# Patient Record
Sex: FEMALE | Race: OTHER | HISPANIC OR LATINO | Employment: FULL TIME | URBAN - METROPOLITAN AREA
[De-identification: names, ages, dates, MRNs, and addresses within clinical notes are randomized per-mention and may not be internally consistent; named-entity substitution may affect disease eponyms.]

---

## 2020-02-28 LAB
EXTERNAL HEMATOCRIT: 29.9 %
EXTERNAL HEMATOCRIT: 29.9 %
EXTERNAL HEMOGLOBIN: 9.9 G/DL
EXTERNAL HEMOGLOBIN: 9.9 G/DL
EXTERNAL SYPHILIS RPR SCREEN: NORMAL
GLUCOSE 1H P 50 G GLC PO SERPL-MCNC: 131 MG/DL (ref 70–183)

## 2020-10-26 LAB
EXTERNAL ABO GROUPING: NORMAL
EXTERNAL ANTIBODY SCREEN: NORMAL
EXTERNAL CHLAMYDIA SCREEN: NORMAL
EXTERNAL GONORRHEA SCREEN: NORMAL
EXTERNAL HEMATOCRIT: 40 %
EXTERNAL HEMOGLOBIN: 13.3 G/DL
EXTERNAL HEPATITIS B SURFACE ANTIGEN: NEGATIVE
EXTERNAL HIV-1/2 AB-AG: NORMAL
EXTERNAL PLATELET COUNT: 353 K/ΜL
EXTERNAL RH FACTOR: POSITIVE
EXTERNAL RUBELLA IGG QUANTITATION: NORMAL
EXTERNAL SYPHILIS RPR SCREEN: NORMAL

## 2021-02-28 LAB
EXTERNAL HEMATOCRIT: 29.9 %
EXTERNAL HEMOGLOBIN: 9.9 G/DL
EXTERNAL PLATELET COUNT: 328 K/ΜL
EXTERNAL SYPHILIS RPR SCREEN: NORMAL
GLUCOSE 1H P 50 G GLC PO SERPL-MCNC: 131 MG/DL (ref 70–183)

## 2021-03-16 ENCOUNTER — INITIAL PRENATAL (OUTPATIENT)
Dept: OBGYN CLINIC | Facility: CLINIC | Age: 26
End: 2021-03-16
Payer: COMMERCIAL

## 2021-03-16 VITALS
WEIGHT: 179 LBS | HEART RATE: 120 BPM | SYSTOLIC BLOOD PRESSURE: 106 MMHG | TEMPERATURE: 98 F | DIASTOLIC BLOOD PRESSURE: 62 MMHG

## 2021-03-16 DIAGNOSIS — O99.280 HYPOTHYROID IN PREGNANCY, ANTEPARTUM: ICD-10-CM

## 2021-03-16 DIAGNOSIS — Z34.80 SUPERVISION OF OTHER NORMAL PREGNANCY, ANTEPARTUM: Primary | ICD-10-CM

## 2021-03-16 DIAGNOSIS — Z3A.28 28 WEEKS GESTATION OF PREGNANCY: ICD-10-CM

## 2021-03-16 DIAGNOSIS — O23.43 URINARY TRACT INFECTION IN MOTHER DURING THIRD TRIMESTER OF PREGNANCY: ICD-10-CM

## 2021-03-16 DIAGNOSIS — E03.9 HYPOTHYROID IN PREGNANCY, ANTEPARTUM: ICD-10-CM

## 2021-03-16 DIAGNOSIS — O09.219 PREVIOUS PRETERM DELIVERY, ANTEPARTUM: ICD-10-CM

## 2021-03-16 DIAGNOSIS — O99.019 MATERNAL ANEMIA IN PREGNANCY, ANTEPARTUM: ICD-10-CM

## 2021-03-16 PROBLEM — O23.40 UTI IN PREGNANCY: Status: ACTIVE | Noted: 2021-03-16

## 2021-03-16 LAB
SL AMB  POCT GLUCOSE, UA: NEGATIVE
SL AMB POCT URINE PROTEIN: 30

## 2021-03-16 PROCEDURE — NOBC: Performed by: CLINICAL NURSE SPECIALIST

## 2021-03-16 PROCEDURE — 76815 OB US LIMITED FETUS(S): CPT | Performed by: CLINICAL NURSE SPECIALIST

## 2021-03-16 PROCEDURE — PNV: Performed by: CLINICAL NURSE SPECIALIST

## 2021-03-16 RX ORDER — LEVOTHYROXINE SODIUM 0.12 MG/1
125 TABLET ORAL DAILY
COMMUNITY

## 2021-03-16 RX ORDER — DOCUSATE SODIUM 100 MG/1
100 CAPSULE, LIQUID FILLED ORAL 2 TIMES DAILY
Qty: 60 CAPSULE | Refills: 3 | Status: SHIPPED | OUTPATIENT
Start: 2021-03-16 | End: 2021-05-04 | Stop reason: HOSPADM

## 2021-03-16 RX ORDER — HYDROXYPROGESTERONE CAPROATE 250 MG/ML
1 INJECTION INTRAMUSCULAR WEEKLY
COMMUNITY
End: 2021-06-03 | Stop reason: ALTCHOICE

## 2021-03-16 RX ORDER — PNV NO.95/FERROUS FUM/FOLIC AC 28MG-0.8MG
1 TABLET ORAL DAILY
COMMUNITY

## 2021-03-16 RX ORDER — PNV NO.95/FERROUS FUM/FOLIC AC 28MG-0.8MG
1 TABLET ORAL DAILY
COMMUNITY
End: 2021-06-03 | Stop reason: SDUPTHER

## 2021-03-16 RX ORDER — FERROUS SULFATE 325(65) MG
325 TABLET ORAL
COMMUNITY
End: 2021-03-16 | Stop reason: SDUPTHER

## 2021-03-16 RX ORDER — LEVOTHYROXINE SODIUM 0.12 MG/1
125 TABLET ORAL DAILY
COMMUNITY
Start: 2020-10-28 | End: 2021-06-03 | Stop reason: SDUPTHER

## 2021-03-16 RX ORDER — HYDROXYPROGESTERONE CAPROATE 250 MG/ML
250 INJECTION INTRAMUSCULAR
COMMUNITY
End: 2021-05-04 | Stop reason: HOSPADM

## 2021-03-16 RX ORDER — FERROUS SULFATE 325(65) MG
325 TABLET ORAL 2 TIMES DAILY WITH MEALS
Qty: 60 TABLET | Refills: 3 | Status: SHIPPED | OUTPATIENT
Start: 2021-03-16 | End: 2021-05-04 | Stop reason: HOSPADM

## 2021-03-16 NOTE — PROGRESS NOTES
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  And will have a lot of pelvic pressure  Sometimes wears a maternity belt- effective at times    The following portions of the patient's history were reviewed and updated as appropriate: allergies, current medications, past family history, past medical history, past social history, past surgical history, and problem list     OB History    Para Term  AB Living   2 1   1   1   SAB TAB Ectopic Multiple Live Births           1      # Outcome Date GA Lbr Ruiz/2nd Weight Sex Delivery Anes PTL Lv   2 Current            1  / 35w6d  2821 g (6 lb 3 5 oz) F Vag-Spont None Y CHANTELLE      Complications:  labor        Aneuploidy screening (low risk/high risk)   OB History        2    Para   1    Term           1    AB        Living   1       SAB        TAB        Ectopic        Multiple        Live Births   1                Pre-gravid BMI: Could not be calculated  Discussed appropriate weight gain in pregnancy based on pre-gravid BMI      Infection Screening   Does the pt have a hx of MRSA? no   Recent travel outside of [place]? no  Zika precautions discussed  COVID symptoms or contact no  Previous VZV vaccine or chicken pox disease:- yes- + hx of vaccine  Vaccinated against Hep B: yes    Immunizations:   influenza vaccine given this flu season: yes   discussed Tdap vaccine administration at 27-28 weeks    Screening  SBIRT Performed yes    Diabetes risk Factors: The following hx was assessed r/t risk for diabetes in pregnancy: Pregestational DM, hx of GDM, BMI >35, 1st degree relative w/ T2 DM, personal hx of PCOS, Metformin use, Prior baby LGA/macrosomia  Pertinent positive: NONE          Hypertension  The following hx was assessed r/t risk for HTN disorder in pregnancy: prior hx of CHTN, gHTN, Pre-eclampsia (or eclampsia or HELLP syndrome), FH  pre-eclampsia, > age 28, Multifetal gestation, Type 1 or Type 2 DM, renal disease, Autoimmune disease, Nulliparity, BMI > 30, > 10 yr pregnancy interval, Previous IUGR/SGA baby  Pertinent positive: none       The following portions of the patient's history were reviewed and updated as appropriate: allergies, current medications, past family history, past medical history, past social history, past surgical history, and problem list                /62 (BP Location: Left arm)   Pulse (!) 120   Temp 98 °F (36 7 °C) (Temporal)   Wt 81 2 kg (179 lb)   LMP 2020         Assessment/Plan:     OB intake completed  She is a  with Patient's last menstrual period was 2020  I reviewed risk factors for pregnancy based on her medical history  Nutrition screening Negative  Referral is not sent  Healthy lifestyles reviewed    Genetic FH reviewed: No risks identified  Risk factors for GDM/HTN: none  Additional risks/problems Identified:   -hx of 35 wk PTD  -hypothyroid  -anemia-recent dx    Discussed genetic screening/testing options- pt interested n/a    Prenatal lab work reviewed  Reviewed routine US to be expected in pregnancy and Sherrill ordered for 34 wks    Pregnancy Education  [facility] Pregnancy Essentials Book reviewed and discussed  Call group and instructions to call the office/answering service in case of an emergency    OB packet/Handouts given addressing   Nutrition, Immunizations, and Medications during pregnancy   Warning Signs During Pregnancy   Baby and Me phone celena guide and support center  [address] and Ultrasounds in Pregnancy    CoronaVirus and Zika precautions discussed and encouraged patient to visit [facility] website for up-to-date information  Warning signs reviewed as well as reasons to call          Problem List Items Addressed This Visit        Pregnancy     Supervision of other normal pregnancy, antepartum - Primary    Relevant Orders    Ambulatory Referral to Maternal Fetal Medicine    Maternal anemia in pregnancy, antepartum    Relevant Medications    ferrous sulfate 325 (65 Fe) mg tablet    docusate sodium (COLACE) 100 mg capsule    Hypothyroid in pregnancy, antepartum    Relevant Medications    levothyroxine 125 mcg tablet    Other Relevant Orders    Ambulatory Referral to Maternal Fetal Medicine    Previous  delivery, antepartum    Relevant Orders    Ambulatory Referral to Maternal Fetal Medicine    UTI in pregnancy

## 2021-03-16 NOTE — ASSESSMENT & PLAN NOTE
Recent dx of anemia of pregnancy - has not started iron  Sent to pharmacy and insctructed to take with OJ or vit c for increased absorption and to take colace daily for constipation prevention, up to BID as needed     Recheck CBC in 6 wks

## 2021-03-16 NOTE — ASSESSMENT & PLAN NOTE
Prior PTD at 35 wks  Currently on Joan weekly  CL done today since not done since level 2 US and is WNL 3 02-3 19cm  S/S PTL reviewed including    Call if having > 4-6 contractions in an hour (average occurring every 10-15 minutes)   Feeling of significant pelvic, lower abdomen, or vaginal pressure   Lower back pain that comes and goes   Any vaginal bleeding or spotting   Watery or bloody vaginal discharge

## 2021-03-16 NOTE — ASSESSMENT & PLAN NOTE
She is a  at 28w5d  Transfer from [address] [place] [address] to date in general  Due for tdap- agreeable but left before given  Exam completed and WNL  Cervix closed/thick  Reassured regaring discomfort- r/t nml pregnancy changes in center of gravity/round ligament   pain  Pap is not indicated and gonorrhea/chlamydia cultures were previously collected and not repeated today  See other A&P for risk factors/identified    I reviewed the expected course of the pregnancy with visits, labs and additional testing  The patient has obtained her prenatal labs and they were reviewed at this visit  She previously declined genetic screening   Had Level 2 US-nml - girl     I have reviewed the maternal as well as infant benefits of breastfeeding with the patient  The patient currently plans to breastfeed  I have reviewed proper nutrition in pregnancy as well as exercise and safe medications that can be used for various common symptoms in pregnancy  Planning TL for contraception  Currently planning [facility] peds- but considering changing to [facility]  All questions were answered to her satisfaction

## 2021-03-16 NOTE — PATIENT INSTRUCTIONS
Contact information for [facility] ([facility]A Maternal Fetal [facility])  Main Number [phone]   [place] Location [phone]    Pregnancy at 32 to 30 Weeks   AMBULATORY CARE:   What changes are happening to your body: You may notice new symptoms such as shortness of breath, heartburn, or swelling of your ankles and feet  You may also have trouble sleeping or contractions  Seek care immediately if:   · You develop a severe headache that does not go away  · You have new or increased vision changes, such as blurred or spotted vision  · You have new or increased swelling in your face or hands  · You have vaginal spotting or bleeding  · Your water broke or you feel warm water gushing or trickling from your vagina  Contact your healthcare provider if:   · You have more than 5 contractions in 1 hour  · You notice any changes in your baby's movements  · You have abdominal cramps, pressure, or tightening  · You have a change in vaginal discharge  · You have chills or a fever  · You have vaginal itching, burning, or pain  · You have yellow, green, white, or foul-smelling vaginal discharge  · You have pain or burning when you urinate, less urine than usual, or pink or bloody urine  · You have questions or concerns about your condition or care  How to care for yourself at this stage of your pregnancy:   · Eat a variety of healthy foods  Healthy foods include fruits, vegetables, whole-grain breads, low-fat dairy foods, beans, lean meats, and fish  Drink liquids as directed  Ask how much liquid to drink each day and which liquids are best for you  Limit caffeine to less than 200 milligrams each day  Limit your intake of fish to 2 servings each week  Choose fish low in mercury such as canned light tuna, shrimp, salmon, cod, or tilapia  Do not  eat fish high in mercury such as swordfish, tilefish, mary mackerel, and shark           · Manage heartburn  by eating 4 or 5 small meals each day instead of large meals  Avoid spicy food  · Manage swelling  by lying down and putting your feet up  · Take prenatal vitamins as directed  Your need for certain vitamins and minerals, such as folic acid, increases during pregnancy  Prenatal vitamins provide some of the extra vitamins and minerals you need  Prenatal vitamins may also help to decrease the risk of certain birth defects  · Talk to your healthcare provider about exercise  Moderate exercise can help you stay fit  Your healthcare provider will help you plan an exercise program that is safe for you during pregnancy  · Do not smoke  Smoking increases your risk of a miscarriage and other health problems during your pregnancy  Smoking can cause your baby to be born too early or weigh less at birth  Ask your healthcare provider for information if you need help quitting  · Do not drink alcohol  Alcohol passes from your body to your baby through the placenta  It can affect your baby's brain development and cause fetal alcohol syndrome (FAS)  FAS is a group of conditions that causes mental, behavior, and growth problems  · Talk to your healthcare provider before you take any medicines  Many medicines may harm your baby if you take them when you are pregnant  Do not take any medicines, vitamins, herbs, or supplements without first talking to your healthcare provider  Never use illegal or street drugs (such as marijuana or cocaine) while you are pregnant  Safety tips during pregnancy:   · Avoid hot tubs and saunas  Do not use a hot tub or sauna while you are pregnant, especially during your first trimester  Hot tubs and saunas may raise your baby's temperature and increase the risk of birth defects  · Avoid toxoplasmosis  This is an infection caused by eating raw meat or being around infected cat feces  It can cause birth defects, miscarriages, and other problems   Wash your hands after you touch raw meat  Make sure any meat is well-cooked before you eat it  Avoid raw eggs and unpasteurized milk  Use gloves or ask someone else to clean your cat's litter box while you are pregnant  Changes that are happening with your baby:  By 30 weeks, your baby may weigh more than 3 pounds  Your baby may be about 11 inches long from the top of the head to the rump (baby's bottom)  Your baby's eyes open and close now  Your baby's kicks and movements are more forceful at this time  What you need to know about prenatal care: Your healthcare provider will check your blood pressure and weight  You may also need the following:  · Blood tests  may be done to check for anemia or blood type  · A urine test  may also be done to check for sugar and protein  These can be signs of gestational diabetes or infection  Protein in your urine may also be a sign of preeclampsia  Preeclampsia is a condition that can develop during week 20 or later of your pregnancy  It causes high blood pressure, and it can cause problems with your kidneys and other organs  · A Tdap vaccine and flu vaccine  may be recommended by your healthcare provider  · A gestational diabetes screen  will be done using an oral glucose tolerance test (OGTT)  An OGTT starts with a blood sugar level check after you have not eaten for 8 hours  You are then given a glucose drink  Your blood sugar level is checked after 1 hour, 2 hours, and sometimes 3 hours  Healthcare providers look at how much your blood sugar level increases from the first check  · Fundal height  is a measurement of your uterus to check your baby's growth  This number is usually the same as the number of weeks that you have been pregnant  Your healthcare provider may also check your baby's position  · Your baby's heart rate  will be checked      © Copyright [address] Information is for End User's use only and may not be sold, redistributed or otherwise used for commercial purposes  All illustrations and images included in CareNotes® are the copyrighted property of A D A M , Inc  or Ramu Ryder  The above information is an  only  It is not intended as medical advice for individual conditions or treatments  Talk to your doctor, nurse or pharmacist before following any medical regimen to see if it is safe and effective for you

## 2021-03-16 NOTE — PROGRESS NOTES
Prenatal Visit  Subjective:   Dickson Bradley is a 31 y o  female  She is a  here for initial PN visit/New OB exam    She is reporting the following pregnancy related complaints:  - pelvic pressure- particularly with standing etc    Denies ctx, LOF or VB  Denies abnormal d/c  Denies decreased FM    OB transfer from [facility] and is up to date  Due for tdap    Just completed OB intake today- see other visit same day  Past history review including family genetic history reveal the following risk factors:  Hypothyroid- on 125 mcg levothyrox, and euthyroid  Hx of PTD at 35 wks- on weekly Joan     Objective:  Patient's last menstrual period was 2020  LMP 2020   Pregravid Weight/BMI: Pregravid weight not on file (BMI Could not be calculated)      OBGyn Exam- see prenatal physical form  2nd/3rd TRIMESTER OBSTETRIC ULTRASOUND  3/16/2021  SHIRA Tran      Alf Perose is a [id number] at 28w5d  INDICATION: hx of PTD/check cervix     TECHNIQUE:   Limited Transvaginal imaging was performed 3/16/2021    The study includes volumetric sweeps and traditional still imaging technique  FINDINGS:    Fetal presentation:  cephalic  Cervical length: 3 08 cm;  Mild Funneling is present     IMPRESSION:     Single intrauterine pregnancy at 28w5d  Normal Cervical length    Assessment & Plan:  1  Supervision of other normal pregnancy, antepartum  Assessment & Plan:  She is a  at 28w5d  Transfer from [facility]  Up to date in general  Due for tdap- agreeable but left before given  Exam completed and WNL  Cervix closed/thick  Reassured regaring discomfort- r/t nml pregnancy changes in center of gravity/round ligament   pain  Pap is not indicated and gonorrhea/chlamydia cultures were previously collected and not repeated today  See other A&P for risk factors/identified    I reviewed the expected course of the pregnancy with visits, labs and additional testing    The patient has obtained her prenatal labs and they were reviewed at this visit  She previously declined genetic screening   Had Level 2 US-nml - girl     I have reviewed the maternal as well as infant benefits of breastfeeding with the patient  The patient currently plans to breastfeed  I have reviewed proper nutrition in pregnancy as well as exercise and safe medications that can be used for various common symptoms in pregnancy  Planning TL for contraception  Currently planning [facility] peds- but considering changing to [facility]  All questions were answered to her satisfaction  2  Maternal anemia in pregnancy, antepartum  Assessment & Plan:  Recent dx of anemia of pregnancy - has not started iron  Sent to pharmacy and insctructed to take with OJ or vit c for increased absorption and to take colace daily for constipation prevention, up to BID as needed  Recheck CBC in 6 wks    Orders:  -     CBC; Future; Expected date: 2021    3  Hypothyroid in pregnancy, antepartum  Assessment & Plan:  Currently on 125 mcg and euthryoid  Last checked end of feb- will be due for recheck end of march-mid April  Followed by Dr Madeline Phipps with [facility] endo-has order at home for recheck          4  Previous  delivery, antepartum  Assessment & Plan:  Prior PTD at 35 wks  Currently on Joan weekly  CL done today since not done since level 2 US and is WNL 3 02-3 19cm  S/S PTL reviewed including    Call if having > 4-6 contractions in an hour (average occurring every 10-15 minutes)   Feeling of significant pelvic, lower abdomen, or vaginal pressure   Lower back pain that comes and goes   Any vaginal bleeding or spotting   Watery or bloody vaginal discharge            5  Urinary tract infection in mother during third trimester of pregnancy  Assessment & Plan:  Hx of UTI in December- recent PETER done and negative     Denies and urinary sxs today      6  28 weeks gestation of pregnancy  -     POCT urine dip      SHIRA Lira  3/16/2021

## 2021-03-16 NOTE — ASSESSMENT & PLAN NOTE
Currently on 125 mcg and euthryoid  Last checked end of feb- will be due for recheck end of march-mid April    Followed by Dr Randa Branham with [facility] endo-has order at home for recheck

## 2021-03-29 ENCOUNTER — TELEPHONE (OUTPATIENT)
Dept: OBGYN CLINIC | Facility: CLINIC | Age: 26
End: 2021-03-29

## 2021-03-29 ENCOUNTER — HOSPITAL ENCOUNTER (OUTPATIENT)
Facility: HOSPITAL | Age: 26
End: 2021-03-29
Attending: OBSTETRICS & GYNECOLOGY | Admitting: OBSTETRICS & GYNECOLOGY
Payer: COMMERCIAL

## 2021-03-29 ENCOUNTER — HOSPITAL ENCOUNTER (OUTPATIENT)
Facility: HOSPITAL | Age: 26
Discharge: HOME | End: 2021-03-29
Attending: OBSTETRICS & GYNECOLOGY | Admitting: OBSTETRICS & GYNECOLOGY
Payer: COMMERCIAL

## 2021-03-29 VITALS
RESPIRATION RATE: 18 BRPM | HEART RATE: 123 BPM | DIASTOLIC BLOOD PRESSURE: 73 MMHG | SYSTOLIC BLOOD PRESSURE: 121 MMHG | TEMPERATURE: 97.9 F

## 2021-03-29 PROCEDURE — 76815 OB US LIMITED FETUS(S): CPT | Performed by: OBSTETRICS & GYNECOLOGY

## 2021-03-29 PROCEDURE — NC001 PR NO CHARGE: Performed by: OBSTETRICS & GYNECOLOGY

## 2021-03-29 PROCEDURE — G0463 HOSPITAL OUTPT CLINIC VISIT: HCPCS

## 2021-03-29 PROCEDURE — 99213 OFFICE O/P EST LOW 20 MIN: CPT

## 2021-03-29 NOTE — PROGRESS NOTES
L&D Triage Note - OB/GYN  Tae Solitario 31 y o  female MRN: [id number]  Unit/Bed#: LD TRIAGE  Encounter: [id number]      Assessment:  31 y o   at 30w4d who presents for evaluation of leakage of fluid  She is not ruptured, with negative pooling, ferning, and nitrazine, and MAX 13 58  She is not in  labor, with closed cervix and no contractions on tocometer  No evidence of infection on wet mount/KOH  Plan:  Stable for discharge home at this time  Reviewed signs and symptoms of  labor  Advised to continue routine prenatal care and follow up with her Ob/Gyn provider      ______________________________________________________________________      Chief Complaint: I had some fluid leak earlier    Subjective:  31 y o  Scooter Venkat at 30w4d who presents for evaluation of leakage of fluid  She reports that earlier today, she had a larger amount of clear/white fluid discharge, did not soak through her pad, and she has not had continued leakage  Given her history of  delivery at 35 weeks and new symptoms, she was advised to present to triage for evaluation  She denies contractions, vaginal bleeding, or decreased fetal movement  She reports that during her last pregnancy she did not feel very uncomfortable when she presented in  labor  Her last intercourse was about 3-4 days ago  She is a patient of Dr Liliya Doe    This pregnancy is complicated by late transfer of care from [facility], history of  delivery at 27 weeks and is on Joan, and hypothyroidism  Objective:  Vitals:    21 1622   BP: 121/73   Pulse: (!) 123   Resp: 18   Temp: 97 9 °F (36 6 °C)       SVE: 0 / 0% / -5  SSE: Physiologic white discharge noted in vaginal vault  No erythema, lesions, lacerations, cervix appears parous    FHT:  150 / Moderate 6 - 25 bpm / 10x10 accelerations present, no decelerations  [place]: none    Wet mount/KOH: No clue cells, no hyphae, no motile organisms  Rupture workup: Nitrazine negative, no Ferning, no Pooling    TAUS  MAX: 13 58  Vertex     Discussed with Dr Jarrett Mcneil MD  3/29/2021 4:57 PM

## 2021-03-29 NOTE — DISCHARGE INSTRUCTIONS
Pregnancy at 31 to 34 [address]:   You may continue to have symptoms such as shortness of breath, heartburn, contractions, or swelling of your ankles and feet  You may be gaining about 1 pound a week now  DISCHARGE INSTRUCTIONS:   Return to the emergency department if:   · You develop a severe headache that does not go away  · You have new or increased vision changes, such as blurred or spotted vision  · You have new or increased swelling in your face or hands  · You have vaginal spotting or bleeding  · Your water broke or you feel warm water gushing or trickling from your vagina  Contact your healthcare provider if:   · You have more than 5 contractions in 1 hour  · You notice any changes in your baby's movements  · You have abdominal cramps, pressure, or tightening  · You have a change in vaginal discharge  · You have chills or a fever  · You have vaginal itching, burning, or pain  · You have yellow, green, white, or foul-smelling vaginal discharge  · You have pain or burning when you urinate, less urine than usual, or pink or bloody urine  · You have questions or concerns about your condition or care  How to care for yourself at this stage of your pregnancy:   · Eat a variety of healthy foods  Healthy foods include fruits, vegetables, whole-grain breads, low-fat dairy foods, beans, lean meats, and fish  Drink liquids as directed  Ask how much liquid to drink each day and which liquids are best for you  Limit caffeine to less than 200 milligrams each day  Limit your intake of fish to 2 servings each week  Choose fish low in mercury such as canned light tuna, shrimp, salmon, cod, or tilapia  Do not  eat fish high in mercury such as swordfish, tilefish, mary mackerel, and shark  · Manage heartburn  by eating 4 or 5 small meals each day instead of large meals  Avoid spicy food  · Manage swelling  by lying down and putting your feet up  · Take prenatal vitamins as directed  Your need for certain vitamins and minerals, such as folic acid, increases during pregnancy  Prenatal vitamins provide some of the extra vitamins and minerals you need  Prenatal vitamins may also help to decrease the risk of certain birth defects  · Talk to your healthcare provider about exercise  Moderate exercise can help you stay fit  Your healthcare provider will help you plan an exercise program that is safe for you during pregnancy  · Do not smoke  Smoking increases your risk of a miscarriage and other health problems during your pregnancy  Smoking can cause your baby to be born too early or weigh less at birth  Ask your healthcare provider for information if you need help quitting  · Do not drink alcohol  Alcohol passes from your body to your baby through the placenta  It can affect your baby's brain development and cause fetal alcohol syndrome (FAS)  FAS is a group of conditions that causes mental, behavior, and growth problems  · Talk to your healthcare provider before you take any medicines  Many medicines may harm your baby if you take them when you are pregnant  Do not take any medicines, vitamins, herbs, or supplements without first talking to your healthcare provider  Never use illegal or street drugs (such as marijuana or cocaine) while you are pregnant  Safety tips during pregnancy:   · Avoid hot tubs and saunas  Do not use a hot tub or sauna while you are pregnant, especially during your first trimester  Hot tubs and saunas may raise your baby's temperature and increase the risk of birth defects  · Avoid toxoplasmosis  This is an infection caused by eating raw meat or being around infected cat feces  It can cause birth defects, miscarriages, and other problems  Wash your hands after you touch raw meat  Make sure any meat is well-cooked before you eat it  Avoid raw eggs and unpasteurized milk   Use gloves or ask someone else to clean your cat's litter box while you are pregnant  Changes that are happening with your baby:  By 34 weeks, your baby may weigh more than 5 pounds  Your baby will be about 12 ½ inches long from the top of the head to the rump (baby's bottom)  Your baby is gaining about ½ pound a week  Your baby's eyes open and close now  Your baby's kicks and movements are more forceful at this time  What you need to know about prenatal care: Your healthcare provider will check your blood pressure and weight  You may also need the following:  · A urine test  may also be done to check for sugar and protein  These can be signs of gestational diabetes or infection  Protein in your urine may also be a sign of preeclampsia  Preeclampsia is a condition that can develop during week 20 or later of your pregnancy  It causes high blood pressure, and it can cause problems with your kidneys and other organs  · A Tdap vaccine  may be recommended by your healthcare provider  · Fundal height  is a measurement of your uterus to check your baby's growth  This number is usually the same as the number of weeks that you have been pregnant  Your healthcare provider may also check your baby's position  · Your baby's heart rate  will be checked  © Copyright [address] Information is for End User's use only and may not be sold, redistributed or otherwise used for commercial purposes  All illustrations and images included in CareNotes® are the copyrighted property of A D A M , Inc  or [facility] Noa Adler   The above information is an  only  It is not intended as medical advice for individual conditions or treatments  Talk to your doctor, nurse or pharmacist before following any medical regimen to see if it is safe and effective for you

## 2021-03-29 NOTE — PROCEDURES
Simin Kwan, a [id number] at 30w4d with an RAS of 6/3/2021, by Last Menstrual Period, was seen at [address] for the following procedure(s): $Procedure Type: MAX]         4 Quadrant MAX  MAX Q1 (cm): 1 5 cm  MAX Q2 (cm): 2 8 cm  MAX Q3 (cm): 4 1 cm  MAX Q4 (cm): 5 2 cm  MAX TOTAL (cm): 13 6 cm  Vertex presentation  Gross fetal movement appreciated

## 2021-03-29 NOTE — TELEPHONE ENCOUNTER
Spoke with patient   Patient reports saturating underwear at 2:15  30 4/7 days  No continuous leakage  H/o PTD On martina  Advised to be seen at THE [facility] due to history and less than 32 weeks  Notified L and D team at THE [facility]

## 2021-03-29 NOTE — TELEPHONE ENCOUNTER
Patient C/O leaking clear fluid starting at 3:10pm today with alta herrera   Spoke to Dr Kierra Wyman and was told to go straight to [address] [place] [address] [place]

## 2021-04-01 ENCOUNTER — ROUTINE PRENATAL (OUTPATIENT)
Dept: OBGYN CLINIC | Facility: CLINIC | Age: 26
End: 2021-04-01
Payer: COMMERCIAL

## 2021-04-01 VITALS
SYSTOLIC BLOOD PRESSURE: 114 MMHG | TEMPERATURE: 98 F | HEART RATE: 111 BPM | DIASTOLIC BLOOD PRESSURE: 70 MMHG | WEIGHT: 181 LBS

## 2021-04-01 DIAGNOSIS — E03.9 HYPOTHYROID IN PREGNANCY, ANTEPARTUM: ICD-10-CM

## 2021-04-01 DIAGNOSIS — Z23 NEED FOR TDAP VACCINATION: ICD-10-CM

## 2021-04-01 DIAGNOSIS — O99.280 HYPOTHYROID IN PREGNANCY, ANTEPARTUM: ICD-10-CM

## 2021-04-01 DIAGNOSIS — Z3A.31 31 WEEKS GESTATION OF PREGNANCY: Primary | ICD-10-CM

## 2021-04-01 DIAGNOSIS — O99.019 MATERNAL ANEMIA IN PREGNANCY, ANTEPARTUM: ICD-10-CM

## 2021-04-01 DIAGNOSIS — O09.219 PREVIOUS PRETERM DELIVERY, ANTEPARTUM: ICD-10-CM

## 2021-04-01 DIAGNOSIS — Z34.80 SUPERVISION OF OTHER NORMAL PREGNANCY, ANTEPARTUM: ICD-10-CM

## 2021-04-01 LAB
ERYTHROCYTE [DISTWIDTH] IN BLOOD BY AUTOMATED COUNT: 15.6 % (ref 11.6–15.1)
FERRITIN SERPL-MCNC: 4 NG/ML (ref 8–388)
HCT VFR BLD AUTO: 31 % (ref 34.8–46.1)
HGB BLD-MCNC: 9 G/DL (ref 11.5–15.4)
MCH RBC QN AUTO: 22.9 PG (ref 26.8–34.3)
MCHC RBC AUTO-ENTMCNC: 29 G/DL (ref 31.4–37.4)
MCV RBC AUTO: 79 FL (ref 82–98)
PLATELET # BLD AUTO: 350 THOUSANDS/UL (ref 149–390)
PMV BLD AUTO: 10.1 FL (ref 8.9–12.7)
RBC # BLD AUTO: 3.93 MILLION/UL (ref 3.81–5.12)
SL AMB  POCT GLUCOSE, UA: NEGATIVE
SL AMB POCT URINE PROTEIN: NEGATIVE
TSH SERPL DL<=0.05 MIU/L-ACNC: 3.18 UIU/ML (ref 0.36–3.74)
WBC # BLD AUTO: 10.85 THOUSAND/UL (ref 4.31–10.16)

## 2021-04-01 PROCEDURE — 36415 COLL VENOUS BLD VENIPUNCTURE: CPT | Performed by: OBSTETRICS & GYNECOLOGY

## 2021-04-01 PROCEDURE — 83550 IRON BINDING TEST: CPT | Performed by: OBSTETRICS & GYNECOLOGY

## 2021-04-01 PROCEDURE — PNV: Performed by: OBSTETRICS & GYNECOLOGY

## 2021-04-01 PROCEDURE — 82728 ASSAY OF FERRITIN: CPT | Performed by: OBSTETRICS & GYNECOLOGY

## 2021-04-01 PROCEDURE — 85027 COMPLETE CBC AUTOMATED: CPT | Performed by: OBSTETRICS & GYNECOLOGY

## 2021-04-01 PROCEDURE — 84443 ASSAY THYROID STIM HORMONE: CPT | Performed by: OBSTETRICS & GYNECOLOGY

## 2021-04-01 NOTE — PATIENT INSTRUCTIONS
Labor   WHAT YOU NEED TO KNOW:    (premature) labor occurs when the uterus contracts and your cervix opens earlier than normal  The cervix is the opening of your uterus   labor happens after the 20th week of pregnancy but before the 37th week  You may have premature rupture of membranes (PROM)  PROM means your water broke before labor began  An early labor could cause you to have your baby before he or she is ready to be born  DISCHARGE INSTRUCTIONS:   Call your local emergency number (911 in the [address]) if:   · You see or feel like there is something in your vagina  Call your doctor if:   · You have bright red, painless vaginal bleeding  · Your symptoms do not get better or they get worse  · Your water broke or you feel warm water gushing or trickling from your vagina  · You have contractions that get stronger and closer together for more than 1 hour  · You notice a decrease in your baby's movement  · You have abdominal cramps, pressure, or tightening  · You have a change in vaginal discharge  · You have a fever  · You have burning when you urinate or you are urinating less than is normal for you  · You have questions or concerns about your condition or care  Medicines:   · Antibiotics  may be given to treat a bacterial infection  · Take your medicine as directed  Contact your healthcare provider if you think your medicine is not helping or if you have side effects  Tell him or her if you are allergic to any medicine  Keep a list of the medicines, vitamins, and herbs you take  Include the amounts, and when and why you take them  Bring the list or the pill bottles to follow-up visits  Carry your medicine list with you in case of an emergency  Self-care:   · Rest  as much as possible  You may need to lie on your left side to improve circulation to the uterus and baby   You may be able to prevent  labor by resting and reducing your physical activity  · Ask your healthcare provider about activities that are safe for you to do  Your healthcare provider or obstetrician may recommend that you avoid sexual intercourse  Ask your healthcare provider if exercise is safe  · Drink liquids as directed  Ask how much liquid to drink each day and which liquids are best for you  · Do not smoke  Your baby may not grow well and he or she may weigh less at birth if you smoke during pregnancy  Smoking also increases the risk that your baby will be born too early  Nicotine and other chemicals in cigarettes and cigars can cause lung damage  Ask your healthcare provider for information if you currently smoke and need help to quit  E-cigarettes or smokeless tobacco still contain nicotine  Talk to your healthcare provider before you use these products  · Do not drink alcohol  Alcohol may harm your unborn baby and cause  labor  · Maintain a healthy weight  A healthy weight may prevent  labor  Ask your healthcare provider how much weight you should gain during your pregnancy  Follow up with your doctor as directed:  Write down your questions so you remember to ask them during your visits  © Copyright [address] Information is for End User's use only and may not be sold, redistributed or otherwise used for commercial purposes  All illustrations and images included in CareNotes® are the copyrighted property of A D A M , Inc  or [employer]  The above information is an  only  It is not intended as medical advice for individual conditions or treatments  Talk to your doctor, nurse or pharmacist before following any medical regimen to see if it is safe and effective for you

## 2021-04-01 NOTE — PROGRESS NOTES
OB/GYN  PN Visit  Apolonio Dubin  [id number]  2021  8:32 AM  Dr Susan Maddox MD    S: 31 y o  [id number] 31w0d here for PN visit  Chief Complaint   Patient presents with    Routine Prenatal Visit     2wk PNV- States she received Tdap 21 @ [facility]  Patient would like to discuss ER instructions         OB complaints:  Contractions: no  Leakage: no  Bleeding: no  Fetal movement: yes       O:  /70 (BP Location: Right arm, Cuff Size: Standard)   Pulse (!) 111   Temp 98 °F (36 7 °C) (Temporal)   Wt 82 1 kg (181 lb)   LMP 2020       Review of Systems   Constitutional: Negative  HENT: Negative  Eyes: Negative  Respiratory: Negative  Cardiovascular: Negative  Gastrointestinal: Negative  Endocrine: Negative  Genitourinary:        As noted in HPI   Musculoskeletal: Negative  Skin: Negative  Allergic/Immunologic: Negative  Neurological: Negative  Hematological: Negative  Psychiatric/Behavioral: Negative  Physical Exam  Constitutional:       General: She is not in acute distress  Appearance: She is well-developed  Abdominal:      Palpations: Abdomen is soft  Tenderness: There is no abdominal tenderness  There is no guarding  Neurological:      Mental Status: She is alert and oriented to person, place, and time  Skin:     General: Skin is warm and dry     Psychiatric:         Behavior: Behavior normal            Fundal Height (cm): 32 cm  Fetal Heart Rate: 156          A/P:    Problem List Items Addressed This Visit        Endocrine    Hypothyroid in pregnancy, antepartum       Other    Supervision of other normal pregnancy, antepartum    Maternal anemia in pregnancy, antepartum    Relevant Orders    CBC    Ferritin    Previous  delivery, antepartum      Other Visit Diagnoses     31 weeks gestation of pregnancy    -  Primary    Relevant Orders    POCT urine dip    Need for Tdap vaccination            TSH was ordered and drawn    On iron twice daily with orange juice, repeat CBC and ferritin drawn in office (Last Hgb 9 9), glucola normal    Continue Joan until 36 weeks - will preauth for 1 more month  PTL precautions    MFM US ordered through [facility]    Follow-up in 2 weeks    Dannie Mays MD  4/1/2021  8:32 AM

## 2021-04-02 DIAGNOSIS — O99.019 MATERNAL ANEMIA IN PREGNANCY, ANTEPARTUM: Primary | ICD-10-CM

## 2021-04-02 LAB — TIBC SERPL-MCNC: 705 UG/DL (ref 250–450)

## 2021-04-02 RX ORDER — SODIUM CHLORIDE 9 MG/ML
20 INJECTION, SOLUTION INTRAVENOUS ONCE
Status: CANCELLED | OUTPATIENT
Start: 2021-04-06

## 2021-04-02 NOTE — RESULT ENCOUNTER NOTE
Spoke with pt and she is taking her Levothyroxine in the morning prior on an empty stomach,  however she has been taking it ith her iron supplements  I reviewed this may be interfering and I will hold off on adjusting her dose and give another 3-4 weeks and  Advised she stop oral iron  Regarding anemia and iron deficiency  I recommended that we start her on iron infusions    See therapy plan

## 2021-04-02 NOTE — RESULT ENCOUNTER NOTE
Abnormal H/H and Ferritin despite oral replacement     Recommend IV iron infusions  Patient will need approximately 5 infusions     Also noted she had TSH done same day and this is mildly elevated for 3rd trimester ideal range  Will recommend increased to 150 mcg after reviewing that she is taking medication properly  Call placed patient in no answer voicemail message left for her to call me back    Also sent message to my chart portal

## 2021-04-06 ENCOUNTER — HOSPITAL ENCOUNTER (OUTPATIENT)
Dept: INFUSION CENTER | Facility: CLINIC | Age: 26
Discharge: HOME | End: 2021-04-06
Payer: COMMERCIAL

## 2021-04-06 VITALS
RESPIRATION RATE: 18 BRPM | TEMPERATURE: 98.8 F | SYSTOLIC BLOOD PRESSURE: 110 MMHG | DIASTOLIC BLOOD PRESSURE: 69 MMHG | HEART RATE: 109 BPM

## 2021-04-06 DIAGNOSIS — O99.019 MATERNAL ANEMIA IN PREGNANCY, ANTEPARTUM: Primary | ICD-10-CM

## 2021-04-06 PROCEDURE — 96365 THER/PROPH/DIAG IV INF INIT: CPT

## 2021-04-06 RX ORDER — SODIUM CHLORIDE 9 MG/ML
20 INJECTION, SOLUTION INTRAVENOUS ONCE
Status: CANCELLED | OUTPATIENT
Start: 2021-04-09

## 2021-04-06 RX ORDER — SODIUM CHLORIDE 9 MG/ML
20 INJECTION, SOLUTION INTRAVENOUS ONCE
Status: COMPLETED | OUTPATIENT
Start: 2021-04-06 | End: 2021-04-06

## 2021-04-06 RX ADMIN — SODIUM CHLORIDE 20 ML/HR: 0.9 INJECTION, SOLUTION INTRAVENOUS at 15:06

## 2021-04-06 RX ADMIN — SODIUM CHLORIDE 200 MG: 9 INJECTION, SOLUTION INTRAVENOUS at 15:06

## 2021-04-06 NOTE — PROGRESS NOTES
Pt arrived to unit without complaint, tolerated Venofer infusion without any adverse reaction  Pt left unit in stable condition without question or concern, AVS declined, pt aware of upcoming schedule

## 2021-04-06 NOTE — PLAN OF CARE
Problem: Potential for Falls  Goal: Patient will remain free of falls  Description: INTERVENTIONS:  - Assess patient frequently for physical needs  -  Identify cognitive and physical deficits and behaviors that affect risk of falls    -  [place] fall precautions as indicated by assessment   - Educate patient/family on patient safety including physical limitations  - Instruct patient to call for assistance with activity based on assessment  - Modify environment to reduce risk of injury  - Consider OT/PT consult to assist with strengthening/mobility  Outcome: Progressing

## 2021-04-07 ENCOUNTER — TELEPHONE (OUTPATIENT)
Dept: OBGYN CLINIC | Facility: CLINIC | Age: 26
End: 2021-04-07

## 2021-04-07 NOTE — TELEPHONE ENCOUNTER
Called patients insurance at [phone] for prior Eliana Desai for [facility]  Spoke to: Unruly Finch, states that patient has met her deductible and medication will be covered at 100%  Auth# [id number] was approved for 4/7-6/6

## 2021-04-09 ENCOUNTER — HOSPITAL ENCOUNTER (OUTPATIENT)
Dept: INFUSION CENTER | Facility: CLINIC | Age: 26
Discharge: HOME | End: 2021-04-09
Payer: COMMERCIAL

## 2021-04-09 VITALS
HEART RATE: 88 BPM | TEMPERATURE: 98.3 F | RESPIRATION RATE: 20 BRPM | DIASTOLIC BLOOD PRESSURE: 72 MMHG | SYSTOLIC BLOOD PRESSURE: 116 MMHG

## 2021-04-09 DIAGNOSIS — O99.019 MATERNAL ANEMIA IN PREGNANCY, ANTEPARTUM: Primary | ICD-10-CM

## 2021-04-09 PROCEDURE — 96365 THER/PROPH/DIAG IV INF INIT: CPT

## 2021-04-09 RX ORDER — SODIUM CHLORIDE 9 MG/ML
20 INJECTION, SOLUTION INTRAVENOUS ONCE
Status: COMPLETED | OUTPATIENT
Start: 2021-04-09 | End: 2021-04-09

## 2021-04-09 RX ORDER — SODIUM CHLORIDE 9 MG/ML
20 INJECTION, SOLUTION INTRAVENOUS ONCE
Status: CANCELLED | OUTPATIENT
Start: 2021-04-13

## 2021-04-09 RX ADMIN — SODIUM CHLORIDE 20 ML/HR: 0.9 INJECTION, SOLUTION INTRAVENOUS at 08:30

## 2021-04-09 RX ADMIN — SODIUM CHLORIDE 200 MG: 9 INJECTION, SOLUTION INTRAVENOUS at 08:33

## 2021-04-09 NOTE — PLAN OF CARE
Problem: Potential for Falls  Goal: Patient will remain free of falls  Description: INTERVENTIONS:  - Assess patient frequently for physical needs  -  Identify cognitive and physical deficits and behaviors that affect risk of falls    -  [place] fall precautions as indicated by assessment   - Educate patient/family on patient safety including physical limitations  - Instruct patient to call for assistance with activity based on assessment  - Modify environment to reduce risk of injury  - Consider OT/PT consult to assist with strengthening/mobility  Outcome: Progressing

## 2021-04-13 ENCOUNTER — HOSPITAL ENCOUNTER (OUTPATIENT)
Dept: INFUSION CENTER | Facility: CLINIC | Age: 26
Discharge: HOME | End: 2021-04-13
Payer: COMMERCIAL

## 2021-04-13 VITALS
TEMPERATURE: 99.2 F | HEART RATE: 108 BPM | RESPIRATION RATE: 16 BRPM | DIASTOLIC BLOOD PRESSURE: 75 MMHG | SYSTOLIC BLOOD PRESSURE: 123 MMHG

## 2021-04-13 DIAGNOSIS — O99.019 MATERNAL ANEMIA IN PREGNANCY, ANTEPARTUM: Primary | ICD-10-CM

## 2021-04-13 PROCEDURE — 96365 THER/PROPH/DIAG IV INF INIT: CPT

## 2021-04-13 RX ORDER — SODIUM CHLORIDE 9 MG/ML
20 INJECTION, SOLUTION INTRAVENOUS ONCE
Status: CANCELLED | OUTPATIENT
Start: 2021-04-16

## 2021-04-13 RX ORDER — SODIUM CHLORIDE 9 MG/ML
20 INJECTION, SOLUTION INTRAVENOUS ONCE
Status: COMPLETED | OUTPATIENT
Start: 2021-04-13 | End: 2021-04-13

## 2021-04-13 RX ADMIN — SODIUM CHLORIDE 20 ML/HR: 0.9 INJECTION, SOLUTION INTRAVENOUS at 15:00

## 2021-04-13 RX ADMIN — SODIUM CHLORIDE 200 MG: 9 INJECTION, SOLUTION INTRAVENOUS at 15:20

## 2021-04-13 NOTE — PLAN OF CARE
Problem: Potential for Falls  Goal: Patient will remain free of falls  Description: INTERVENTIONS:  - Assess patient frequently for physical needs  -  Identify cognitive and physical deficits and behaviors that affect risk of falls    -  [place] fall precautions as indicated by assessment   - Educate patient/family on patient safety including physical limitations  - Instruct patient to call for assistance with activity based on assessment  - Modify environment to reduce risk of injury  - Consider OT/PT consult to assist with strengthening/mobility  Outcome: Progressing

## 2021-04-16 ENCOUNTER — HOSPITAL ENCOUNTER (OUTPATIENT)
Dept: INFUSION CENTER | Facility: CLINIC | Age: 26
Discharge: HOME | End: 2021-04-16
Payer: COMMERCIAL

## 2021-04-16 VITALS — TEMPERATURE: 97.9 F

## 2021-04-16 DIAGNOSIS — O99.019 MATERNAL ANEMIA IN PREGNANCY, ANTEPARTUM: Primary | ICD-10-CM

## 2021-04-16 PROCEDURE — 96365 THER/PROPH/DIAG IV INF INIT: CPT

## 2021-04-16 RX ORDER — SODIUM CHLORIDE 9 MG/ML
20 INJECTION, SOLUTION INTRAVENOUS ONCE
Status: COMPLETED | OUTPATIENT
Start: 2021-04-16 | End: 2021-04-16

## 2021-04-16 RX ORDER — SODIUM CHLORIDE 9 MG/ML
20 INJECTION, SOLUTION INTRAVENOUS ONCE
Status: CANCELLED | OUTPATIENT
Start: 2021-04-20

## 2021-04-16 RX ADMIN — SODIUM CHLORIDE 200 MG: 9 INJECTION, SOLUTION INTRAVENOUS at 14:59

## 2021-04-16 RX ADMIN — SODIUM CHLORIDE 20 ML/HR: 0.9 INJECTION, SOLUTION INTRAVENOUS at 14:59

## 2021-04-16 NOTE — PROGRESS NOTES
Patient tolerated Venofer infusion with no complications  Pt is aware to return on Monday for next infusion   Declined AVS

## 2021-04-17 NOTE — PROGRESS NOTES
OB/GYN  PN Visit  Michelle Coto  [id number]  2021  11:08 AM  Dr Sage Hercules MD    S: 31 y o   33 4  here for PN visit  Chief Complaint   Patient presents with    Routine Prenatal Visit     Labs UTD, already had TDAP, and needs to sign delivery consent  C/O contractions yesterday every 5-10min apart  OB complaints:  Contractions: yesterday 5-10 mins apart last night, resolved  Leakage: no  Bleeding: no  Fetal movement: yes      O:  /60 (BP Location: Left arm, Cuff Size: Large)   Pulse (!) 111   Temp 98 °F (36 7 °C) (Temporal)   Wt 83 2 kg (183 lb 6 4 oz)   LMP 2020       Review of Systems   Constitutional: Negative  HENT: Negative  Eyes: Negative  Respiratory: Negative  Cardiovascular: Negative  Gastrointestinal: Negative  Endocrine: Negative  Genitourinary:        As noted in HPI   Musculoskeletal: Negative  Skin: Negative  Allergic/Immunologic: Negative  Neurological: Negative  Hematological: Negative  Psychiatric/Behavioral: Negative  Physical Exam  Constitutional:       General: She is not in acute distress  Appearance: She is well-developed  Abdominal:      Palpations: Abdomen is soft  Tenderness: There is no abdominal tenderness  There is no guarding  Neurological:      Mental Status: She is alert and oriented to person, place, and time  Skin:     General: Skin is warm and dry     Psychiatric:         Behavior: Behavior normal            Fundal Height (cm): 34 cm  Fetal Heart Rate: 154  Cl 29-34 mm    Cervix: 1/50/-2/soft midline      A/P:    Problem List Items Addressed This Visit        Endocrine    Hypothyroid in pregnancy, antepartum    Relevant Orders    TSH, 3rd generation with Free T4 reflex       Other    Maternal anemia in pregnancy, antepartum    Relevant Orders    CBC    Previous  delivery, antepartum      Other Visit Diagnoses     Third trimester pregnancy    -  Primary    33 weeks gestation of pregnancy        Relevant Orders    POCT urine dip     contractions              Patient with  contractions last night, now resolved  No contractions on NST  Cervix is slightly dilated at 1 centimeter  Patient with a slightly short cervix at 29-33 millimeters  Patient with history of  labor at 35 weeks and is currently on Norborne injection  I advised  Continuation of 17OHP  Until 36 weeks   labor precautions and advised to call the office if with continued symptoms  Advise administration of betamethasone today and tomorrow for fetal lung maturity  I spoke with [facility] (Dr Cynthia Bautista) and he agrees  Patient will receive 1st dose of betamethasone at [facility]  Follow-up in 2 weeks for routine OB visit  I advised a repeat TSH and CBC in 2 weeks  Patient to have growth scan/ level 2 ultrasound at [facility] today  She is a late transfer from [facility]              Jenn Perez MD  2021  11:08 AM

## 2021-04-17 NOTE — PATIENT INSTRUCTIONS
Pregnancy at 31 to 34 [address]:   You may continue to have symptoms such as shortness of breath, heartburn, contractions, or swelling of your ankles and feet  You may be gaining about 1 pound a week now  DISCHARGE INSTRUCTIONS:   Return to the emergency department if:   · You develop a severe headache that does not go away  · You have new or increased vision changes, such as blurred or spotted vision  · You have new or increased swelling in your face or hands  · You have vaginal spotting or bleeding  · Your water broke or you feel warm water gushing or trickling from your vagina  Contact your healthcare provider if:   · You have more than 5 contractions in 1 hour  · You notice any changes in your baby's movements  · You have abdominal cramps, pressure, or tightening  · You have a change in vaginal discharge  · You have chills or a fever  · You have vaginal itching, burning, or pain  · You have yellow, green, white, or foul-smelling vaginal discharge  · You have pain or burning when you urinate, less urine than usual, or pink or bloody urine  · You have questions or concerns about your condition or care  How to care for yourself at this stage of your pregnancy:   · Eat a variety of healthy foods  Healthy foods include fruits, vegetables, whole-grain breads, low-fat dairy foods, beans, lean meats, and fish  Drink liquids as directed  Ask how much liquid to drink each day and which liquids are best for you  Limit caffeine to less than 200 milligrams each day  Limit your intake of fish to 2 servings each week  Choose fish low in mercury such as canned light tuna, shrimp, salmon, cod, or tilapia  Do not  eat fish high in mercury such as swordfish, tilefish, mary mackerel, and shark  · Manage heartburn  by eating 4 or 5 small meals each day instead of large meals  Avoid spicy food  · Manage swelling  by lying down and putting your feet up  · Take prenatal vitamins as directed  Your need for certain vitamins and minerals, such as folic acid, increases during pregnancy  Prenatal vitamins provide some of the extra vitamins and minerals you need  Prenatal vitamins may also help to decrease the risk of certain birth defects  · Talk to your healthcare provider about exercise  Moderate exercise can help you stay fit  Your healthcare provider will help you plan an exercise program that is safe for you during pregnancy  · Do not smoke  Smoking increases your risk of a miscarriage and other health problems during your pregnancy  Smoking can cause your baby to be born too early or weigh less at birth  Ask your healthcare provider for information if you need help quitting  · Do not drink alcohol  Alcohol passes from your body to your baby through the placenta  It can affect your baby's brain development and cause fetal alcohol syndrome (FAS)  FAS is a group of conditions that causes mental, behavior, and growth problems  · Talk to your healthcare provider before you take any medicines  Many medicines may harm your baby if you take them when you are pregnant  Do not take any medicines, vitamins, herbs, or supplements without first talking to your healthcare provider  Never use illegal or street drugs (such as marijuana or cocaine) while you are pregnant  Safety tips during pregnancy:   · Avoid hot tubs and saunas  Do not use a hot tub or sauna while you are pregnant, especially during your first trimester  Hot tubs and saunas may raise your baby's temperature and increase the risk of birth defects  · Avoid toxoplasmosis  This is an infection caused by eating raw meat or being around infected cat feces  It can cause birth defects, miscarriages, and other problems  Wash your hands after you touch raw meat  Make sure any meat is well-cooked before you eat it  Avoid raw eggs and unpasteurized milk   Use gloves or ask someone else to clean your cat's litter box while you are pregnant  Changes that are happening with your baby:  By 34 weeks, your baby may weigh more than 5 pounds  Your baby will be about 12 ½ inches long from the top of the head to the rump (baby's bottom)  Your baby is gaining about ½ pound a week  Your baby's eyes open and close now  Your baby's kicks and movements are more forceful at this time  What you need to know about prenatal care: Your healthcare provider will check your blood pressure and weight  You may also need the following:  · A urine test  may also be done to check for sugar and protein  These can be signs of gestational diabetes or infection  Protein in your urine may also be a sign of preeclampsia  Preeclampsia is a condition that can develop during week 20 or later of your pregnancy  It causes high blood pressure, and it can cause problems with your kidneys and other organs  · A Tdap vaccine  may be recommended by your healthcare provider  · Fundal height  is a measurement of your uterus to check your baby's growth  This number is usually the same as the number of weeks that you have been pregnant  Your healthcare provider may also check your baby's position  · Your baby's heart rate  will be checked  © Copyright [address] Information is for End User's use only and may not be sold, redistributed or otherwise used for commercial purposes  All illustrations and images included in CareNotes® are the copyrighted property of A D A M , Inc  or [facility] Noa Adler   The above information is an  only  It is not intended as medical advice for individual conditions or treatments  Talk to your doctor, nurse or pharmacist before following any medical regimen to see if it is safe and effective for you

## 2021-04-19 ENCOUNTER — ROUTINE PRENATAL (OUTPATIENT)
Dept: OBGYN CLINIC | Facility: CLINIC | Age: 26
End: 2021-04-19
Payer: COMMERCIAL

## 2021-04-19 ENCOUNTER — ROUTINE PRENATAL (OUTPATIENT)
Dept: PERINATAL CARE | Facility: OTHER | Age: 26
End: 2021-04-19
Payer: COMMERCIAL

## 2021-04-19 ENCOUNTER — HOSPITAL ENCOUNTER (OUTPATIENT)
Dept: INFUSION CENTER | Facility: CLINIC | Age: 26
Discharge: HOME | End: 2021-04-19
Payer: COMMERCIAL

## 2021-04-19 VITALS
BODY MASS INDEX: 32.5 KG/M2 | HEIGHT: 63 IN | WEIGHT: 183.4 LBS | HEART RATE: 111 BPM | SYSTOLIC BLOOD PRESSURE: 102 MMHG | DIASTOLIC BLOOD PRESSURE: 60 MMHG | TEMPERATURE: 98 F

## 2021-04-19 VITALS
SYSTOLIC BLOOD PRESSURE: 100 MMHG | TEMPERATURE: 98.7 F | DIASTOLIC BLOOD PRESSURE: 66 MMHG | HEART RATE: 99 BPM | RESPIRATION RATE: 18 BRPM

## 2021-04-19 VITALS
DIASTOLIC BLOOD PRESSURE: 75 MMHG | HEIGHT: 64 IN | SYSTOLIC BLOOD PRESSURE: 113 MMHG | HEART RATE: 106 BPM | WEIGHT: 183.2 LBS | BODY MASS INDEX: 31.28 KG/M2

## 2021-04-19 DIAGNOSIS — O99.019 MATERNAL ANEMIA IN PREGNANCY, ANTEPARTUM: Primary | ICD-10-CM

## 2021-04-19 DIAGNOSIS — Z3A.33 33 WEEKS GESTATION OF PREGNANCY: ICD-10-CM

## 2021-04-19 DIAGNOSIS — O99.280 HYPOTHYROID IN PREGNANCY, ANTEPARTUM: ICD-10-CM

## 2021-04-19 DIAGNOSIS — E03.9 HYPOTHYROID IN PREGNANCY, ANTEPARTUM: ICD-10-CM

## 2021-04-19 DIAGNOSIS — O47.00 PRETERM CONTRACTIONS: Primary | ICD-10-CM

## 2021-04-19 DIAGNOSIS — Z34.93 THIRD TRIMESTER PREGNANCY: Primary | ICD-10-CM

## 2021-04-19 DIAGNOSIS — O09.219 PREVIOUS PRETERM DELIVERY, ANTEPARTUM: ICD-10-CM

## 2021-04-19 DIAGNOSIS — O47.00 PRETERM CONTRACTIONS: ICD-10-CM

## 2021-04-19 DIAGNOSIS — Z34.80 SUPERVISION OF OTHER NORMAL PREGNANCY, ANTEPARTUM: ICD-10-CM

## 2021-04-19 DIAGNOSIS — O99.019 MATERNAL ANEMIA IN PREGNANCY, ANTEPARTUM: ICD-10-CM

## 2021-04-19 LAB
SL AMB  POCT GLUCOSE, UA: NEGATIVE
SL AMB POCT URINE PROTEIN: NEGATIVE

## 2021-04-19 PROCEDURE — 76811 OB US DETAILED SNGL FETUS: CPT | Performed by: OBSTETRICS & GYNECOLOGY

## 2021-04-19 PROCEDURE — PNV: Performed by: OBSTETRICS & GYNECOLOGY

## 2021-04-19 PROCEDURE — 96365 THER/PROPH/DIAG IV INF INIT: CPT

## 2021-04-19 PROCEDURE — 59025 FETAL NON-STRESS TEST: CPT | Performed by: OBSTETRICS & GYNECOLOGY

## 2021-04-19 RX ORDER — SODIUM CHLORIDE 9 MG/ML
20 INJECTION, SOLUTION INTRAVENOUS ONCE
Status: CANCELLED | OUTPATIENT
Start: 2021-04-20

## 2021-04-19 RX ORDER — SODIUM CHLORIDE 9 MG/ML
20 INJECTION, SOLUTION INTRAVENOUS ONCE
Status: COMPLETED | OUTPATIENT
Start: 2021-04-19 | End: 2021-04-19

## 2021-04-19 RX ORDER — BETAMETHASONE SODIUM PHOSPHATE AND BETAMETHASONE ACETATE 3; 3 MG/ML; MG/ML
12 INJECTION, SUSPENSION INTRA-ARTICULAR; INTRALESIONAL; INTRAMUSCULAR; SOFT TISSUE EVERY 24 HOURS
Status: COMPLETED | OUTPATIENT
Start: 2021-04-19 | End: 2021-04-20

## 2021-04-19 RX ADMIN — SODIUM CHLORIDE 200 MG: 9 INJECTION, SOLUTION INTRAVENOUS at 08:29

## 2021-04-19 RX ADMIN — SODIUM CHLORIDE 20 ML/HR: 0.9 INJECTION, SOLUTION INTRAVENOUS at 08:18

## 2021-04-19 RX ADMIN — BETAMETHASONE SODIUM PHOSPHATE AND BETAMETHASONE ACETATE 12 MG: 3; 3 INJECTION, SUSPENSION INTRA-ARTICULAR; INTRALESIONAL; INTRAMUSCULAR at 13:39

## 2021-04-19 NOTE — LETTER
2021     Abdi Quinones  [address] [facility],[address] [facility]  [address], [facility] [id number]    Patient: María Luevano   YOB: 1995   Date of Visit: 2021       Dear Ms Pizarro: Thank you for referring María Luevano to me for evaluation  Below are my notes for this consultation  If you have questions, please do not hesitate to call me  I look forward to following your patient along with you  Sincerely,        Martha Marrero MD        CC: No Recipients  Martha Marrero MD  2021  7:01 PM  Sign when Signing Visit  Ob ultrasound    Ms Megan Brown  is a 32 yo  patient  Hx of  delivery  Obesity  Normal 1 hr GTT  Fetal ultrasound at 33 4/7   weeks gestation  to evaluate growth  See Ob procedures in EPIC  Ultrasound:    1  Fetal size = dates  EFW= 4lb 14 oz = 41%    2  No fetal anomalies observed  3  Normal MAX    Ob Hx:      2018:  vaginal delivery after spontaneous labor, live baby girl 6lb 3 oz      : Recent transfer of care  On IM progesterone injections for reduction of risk of recurrent  birth  Medical Hx: Asthma as a child  Hypothyroidism Dx in 2018  Surgical Hx: Cholecystectomy in 2018  Medications: Joan 250 mg weekly  Levothyroxine 124 mcg; PNV  Albuterol inhaler 0nce in 6 months  No tobacco, alcohol or illict drug use  Family Hx: not contributory  Hypothyroidism is characterized by inadequate thyroid hormone production, and usually requires elevated TSH, and low free T4 (or free T3)  Sub-clinical hypothyroidism requires elevated TSH but normal free T4  In pregnancy it has demonstrated it does not have to be treated Alexander Harris al  [facility], published in the [facility],[place] [address] of [facility] 2017; [id number] )  Hashimoto's thyroiditis is the most common cause of hypothyroidism in pregnancy, and thyroid peroxidase antibodies are seen in >90% of these women  Untreated or partially treated hypothyroidism is associated with increased risk of pre-eclampsia, abruption,  birth, low birth weight, fetal death, and long-term impaired psychomotor function  Women at high risk for hypothyroidism should be screened with TSH and free FT4  Goal of levothyroxine treatment in pregnancy is maternal serum TSH 0 5-2 0mcu/mL, and free T4 in upper third of normal range  Most women need increase in thyroxine replacement dose  TSH and free T4 levels should be checked preconceptionally, at first prenatal visit in first trimester, 4 weeks after altering the doses, (therefore every 4 weeks until TSH is normal, especially in first 20weeks), and at least every trimester in pregnancy  I reviewed the results of this ultrasound and answered  all the questions over the telephone as Ms Maribel Sushma left the office before I could meet with her, as I was seeing other patients at the time  I apologized when I reached her by telephone  Recommendations:    1  Follow-up ultrasound in 4 weeks to monitor fetal growth and development which was scheduled today  2  I did not discuss an increased risk for  birth  or use of  IM progesterone injections, to reduced the recurrence of  birth  I did review symptoms associated with labor  Thank you for referring your patient to our offices  The limitations of ultrasound to detect all anomalies was reviewed and how it is not  a test to rule out aneuploidy  If you have any further questions do not hesitate to contact us as [phone]      Leslie Reyez MD

## 2021-04-19 NOTE — PLAN OF CARE
Problem: Potential for Falls  Goal: Patient will remain free of falls  Description: INTERVENTIONS:  - Assess patient frequently for physical needs  -  Identify cognitive and physical deficits and behaviors that affect risk of falls    -  Jenison fall precautions as indicated by assessment   - Educate patient/family on patient safety including physical limitations  - Instruct patient to call for assistance with activity based on assessment  - Modify environment to reduce risk of injury  - Consider OT/PT consult to assist with strengthening/mobility  Outcome: Progressing

## 2021-04-20 ENCOUNTER — CLINICAL SUPPORT (OUTPATIENT)
Dept: PERINATAL CARE | Facility: CLINIC | Age: 26
End: 2021-04-20
Payer: COMMERCIAL

## 2021-04-20 DIAGNOSIS — O09.219 PREVIOUS PRETERM DELIVERY, ANTEPARTUM: ICD-10-CM

## 2021-04-20 PROCEDURE — 96372 THER/PROPH/DIAG INJ SC/IM: CPT

## 2021-04-20 RX ADMIN — BETAMETHASONE SODIUM PHOSPHATE AND BETAMETHASONE ACETATE 12 MG: 3; 3 INJECTION, SUSPENSION INTRA-ARTICULAR; INTRALESIONAL; INTRAMUSCULAR at 16:05

## 2021-04-22 ENCOUNTER — TELEPHONE (OUTPATIENT)
Dept: OBGYN CLINIC | Facility: CLINIC | Age: 26
End: 2021-04-22

## 2021-04-22 NOTE — TELEPHONE ENCOUNTER
Called and spoke to Dilip CONTRERAS at [facility]  She explained that it needed to be by auto injector  Pharmacy was stating they office needed to verify the prescription  Called provider Dr Riojas and verified that IM was acceptable  Called [facility] [phone] and verified the prescription  Hellen Marquis stated that it was verified and it would be processed

## 2021-04-22 NOTE — TELEPHONE ENCOUNTER
Incoming voicemail left from a Claudette with [facility] requesting to speak with you in regards to possible confusion on patients injection/therapy  States [facility] cancelled injection due to alternate generic therapy but pt was already using generic which they don't assist with   Direct line is [phone] EXT 2006

## 2021-04-24 PROBLEM — Z3A.33 33 WEEKS GESTATION OF PREGNANCY: Status: ACTIVE | Noted: 2021-04-24

## 2021-04-28 ENCOUNTER — TELEPHONE (OUTPATIENT)
Dept: OBGYN CLINIC | Facility: CLINIC | Age: 26
End: 2021-04-28

## 2021-05-03 ENCOUNTER — HOSPITAL ENCOUNTER (OUTPATIENT)
Facility: HOSPITAL | Age: 26
Setting detail: OBSERVATION
Discharge: HOME | End: 2021-05-04
Attending: STUDENT IN AN ORGANIZED HEALTH CARE EDUCATION/TRAINING PROGRAM | Admitting: STUDENT IN AN ORGANIZED HEALTH CARE EDUCATION/TRAINING PROGRAM
Payer: COMMERCIAL

## 2021-05-03 ENCOUNTER — ROUTINE PRENATAL (OUTPATIENT)
Dept: OBGYN CLINIC | Facility: CLINIC | Age: 26
End: 2021-05-03

## 2021-05-03 ENCOUNTER — TELEPHONE (OUTPATIENT)
Dept: LABOR AND DELIVERY | Facility: HOSPITAL | Age: 26
End: 2021-05-03

## 2021-05-03 VITALS
WEIGHT: 182.4 LBS | DIASTOLIC BLOOD PRESSURE: 62 MMHG | HEART RATE: 107 BPM | SYSTOLIC BLOOD PRESSURE: 110 MMHG | BODY MASS INDEX: 31.31 KG/M2 | TEMPERATURE: 98 F

## 2021-05-03 DIAGNOSIS — O09.219 PREVIOUS PRETERM DELIVERY, ANTEPARTUM: ICD-10-CM

## 2021-05-03 DIAGNOSIS — Z3A.35 35 WEEKS GESTATION OF PREGNANCY: Primary | ICD-10-CM

## 2021-05-03 DIAGNOSIS — O99.280 HYPOTHYROID IN PREGNANCY, ANTEPARTUM: ICD-10-CM

## 2021-05-03 DIAGNOSIS — E03.9 HYPOTHYROID IN PREGNANCY, ANTEPARTUM: ICD-10-CM

## 2021-05-03 DIAGNOSIS — Z34.80 SUPERVISION OF OTHER NORMAL PREGNANCY, ANTEPARTUM: ICD-10-CM

## 2021-05-03 DIAGNOSIS — O99.019 MATERNAL ANEMIA IN PREGNANCY, ANTEPARTUM: ICD-10-CM

## 2021-05-03 LAB
ABO GROUP BLD: NORMAL
BASOPHILS # BLD AUTO: 0.03 THOUSANDS/ΜL (ref 0–0.1)
BASOPHILS NFR BLD AUTO: 0 % (ref 0–1)
BLD GP AB SCN SERPL QL: NEGATIVE
EOSINOPHIL # BLD AUTO: 0.07 THOUSAND/ΜL (ref 0–0.61)
EOSINOPHIL NFR BLD AUTO: 1 % (ref 0–6)
ERYTHROCYTE [DISTWIDTH] IN BLOOD BY AUTOMATED COUNT: 24.5 % (ref 11.6–15.1)
EXTERNAL GROUP B STREP ANTIGEN: NORMAL
HCT VFR BLD AUTO: 37.5 % (ref 34.8–46.1)
HGB BLD-MCNC: 11.3 G/DL (ref 11.5–15.4)
IMM GRANULOCYTES # BLD AUTO: 0.17 THOUSAND/UL (ref 0–0.2)
IMM GRANULOCYTES NFR BLD AUTO: 2 % (ref 0–2)
LYMPHOCYTES # BLD AUTO: 1.31 THOUSANDS/ΜL (ref 0.6–4.47)
LYMPHOCYTES NFR BLD AUTO: 18 % (ref 14–44)
MCH RBC QN AUTO: 24.9 PG (ref 26.8–34.3)
MCHC RBC AUTO-ENTMCNC: 30.1 G/DL (ref 31.4–37.4)
MCV RBC AUTO: 83 FL (ref 82–98)
MONOCYTES # BLD AUTO: 0.71 THOUSAND/ΜL (ref 0.17–1.22)
MONOCYTES NFR BLD AUTO: 10 % (ref 4–12)
NEUTROPHILS # BLD AUTO: 4.86 THOUSANDS/ΜL (ref 1.85–7.62)
NEUTS SEG NFR BLD AUTO: 69 % (ref 43–75)
NRBC BLD AUTO-RTO: 0 /100 WBCS
PLATELET # BLD AUTO: 248 THOUSANDS/UL (ref 149–390)
PMV BLD AUTO: 10.2 FL (ref 8.9–12.7)
RBC # BLD AUTO: 4.54 MILLION/UL (ref 3.81–5.12)
RH BLD: POSITIVE
SL AMB  POCT GLUCOSE, UA: NEGATIVE
SL AMB POCT URINE PROTEIN: NEGATIVE
SPECIMEN EXPIRATION DATE: NORMAL
TSH SERPL DL<=0.05 MIU/L-ACNC: 1.96 UIU/ML (ref 0.36–3.74)
WBC # BLD AUTO: 7.15 THOUSAND/UL (ref 4.31–10.16)

## 2021-05-03 PROCEDURE — PNV: Performed by: OBSTETRICS & GYNECOLOGY

## 2021-05-03 PROCEDURE — G0379 DIRECT REFER HOSPITAL OBSERV: HCPCS

## 2021-05-03 PROCEDURE — G0463 HOSPITAL OUTPT CLINIC VISIT: HCPCS

## 2021-05-03 PROCEDURE — 85025 COMPLETE CBC W/AUTO DIFF WBC: CPT | Performed by: OBSTETRICS & GYNECOLOGY

## 2021-05-03 PROCEDURE — 86900 BLOOD TYPING SEROLOGIC ABO: CPT | Performed by: OBSTETRICS & GYNECOLOGY

## 2021-05-03 PROCEDURE — 86592 SYPHILIS TEST NON-TREP QUAL: CPT | Performed by: OBSTETRICS & GYNECOLOGY

## 2021-05-03 PROCEDURE — 86901 BLOOD TYPING SEROLOGIC RH(D): CPT | Performed by: OBSTETRICS & GYNECOLOGY

## 2021-05-03 PROCEDURE — 86850 RBC ANTIBODY SCREEN: CPT | Performed by: OBSTETRICS & GYNECOLOGY

## 2021-05-03 PROCEDURE — 87150 DNA/RNA AMPLIFIED PROBE: CPT | Performed by: OBSTETRICS & GYNECOLOGY

## 2021-05-03 PROCEDURE — 99204 OFFICE O/P NEW MOD 45 MIN: CPT

## 2021-05-03 PROCEDURE — NC001 PR NO CHARGE: Performed by: STUDENT IN AN ORGANIZED HEALTH CARE EDUCATION/TRAINING PROGRAM

## 2021-05-03 PROCEDURE — 84443 ASSAY THYROID STIM HORMONE: CPT | Performed by: OBSTETRICS & GYNECOLOGY

## 2021-05-03 PROCEDURE — 99219 PR INITIAL OBSERVATION CARE/DAY 50 MINUTES: CPT | Performed by: STUDENT IN AN ORGANIZED HEALTH CARE EDUCATION/TRAINING PROGRAM

## 2021-05-03 RX ORDER — SODIUM CHLORIDE, SODIUM LACTATE, POTASSIUM CHLORIDE, CALCIUM CHLORIDE 600; 310; 30; 20 MG/100ML; MG/100ML; MG/100ML; MG/100ML
125 INJECTION, SOLUTION INTRAVENOUS CONTINUOUS
Status: DISCONTINUED | OUTPATIENT
Start: 2021-05-03 | End: 2021-05-04

## 2021-05-03 RX ORDER — ONDANSETRON 2 MG/ML
4 INJECTION INTRAMUSCULAR; INTRAVENOUS EVERY 6 HOURS PRN
Status: DISCONTINUED | OUTPATIENT
Start: 2021-05-03 | End: 2021-05-04 | Stop reason: HOSPADM

## 2021-05-03 RX ORDER — ACETAMINOPHEN 325 MG/1
650 TABLET ORAL EVERY 6 HOURS PRN
Status: DISCONTINUED | OUTPATIENT
Start: 2021-05-03 | End: 2021-05-04 | Stop reason: HOSPADM

## 2021-05-03 RX ADMIN — SODIUM CHLORIDE 2.5 MILLION UNITS: 9 INJECTION, SOLUTION INTRAVENOUS at 15:21

## 2021-05-03 RX ADMIN — SODIUM CHLORIDE 2.5 MILLION UNITS: 9 INJECTION, SOLUTION INTRAVENOUS at 23:52

## 2021-05-03 RX ADMIN — SODIUM CHLORIDE, SODIUM LACTATE, POTASSIUM CHLORIDE, AND CALCIUM CHLORIDE 1000 ML: .6; .31; .03; .02 INJECTION, SOLUTION INTRAVENOUS at 11:09

## 2021-05-03 RX ADMIN — SODIUM CHLORIDE 5 MILLION UNITS: 0.9 INJECTION, SOLUTION INTRAVENOUS at 11:28

## 2021-05-03 RX ADMIN — ACETAMINOPHEN 650 MG: 325 TABLET ORAL at 16:41

## 2021-05-03 RX ADMIN — SODIUM CHLORIDE, SODIUM LACTATE, POTASSIUM CHLORIDE, AND CALCIUM CHLORIDE 125 ML/HR: .6; .31; .03; .02 INJECTION, SOLUTION INTRAVENOUS at 16:38

## 2021-05-03 RX ADMIN — SODIUM CHLORIDE 2.5 MILLION UNITS: 9 INJECTION, SOLUTION INTRAVENOUS at 18:57

## 2021-05-03 NOTE — H&P
H&P Exam - Obstetrics   Yoana Sullivan 31 y o  female MRN: [id number]  Unit/Bed#: L&D 326-01 Encounter: [id number]      History of Present Illness     Chief Complaint: Contractions    HPI:  Yoana Sullivan is a 31 y o   female with an RAS of 6/3/2021, by Last Menstrual Period at 35w4d weeks gestation who is being admitted for contractions and  cervical dilation  Cervical exam now changed to 5cm dilated from 3cm this morning  Contractions: Present, spacing out slightly  Loss of fluid: No  Vaginal bleeding: no  Fetal movement: present    She is a Dr Borrero Ryan patient  PREGNANCY COMPLICATIONS:   1) History of  delivery at 35 weeks, on [place]  2) Anemia in pregnancy  3) Hypothyroidism in pregnancy    OB History    Para Term  AB Living   2 1   1   1   SAB TAB Ectopic Multiple Live Births           1      # Outcome Date GA Lbr Ruiz/2nd Weight Sex Delivery Anes PTL Lv   2 Current            1  18 35w6d  2821 g (6 lb 3 5 oz) F Vag-Spont None Y CHANTELLE      Complications:  labor       Baby complications/comments: None    Review of Systems   Constitutional: Negative for fever  HENT: Negative for rhinorrhea, sinus pressure, sneezing and sore throat  Eyes: Negative for visual disturbance  Respiratory: Negative for cough, shortness of breath and wheezing  Cardiovascular: Negative for chest pain, palpitations and leg swelling  Gastrointestinal: Positive for abdominal pain  Negative for abdominal distention, blood in stool, nausea and vomiting  Genitourinary: Negative for dysuria, flank pain, vaginal bleeding and vaginal discharge  Musculoskeletal: Negative for neck pain and neck stiffness  Skin: Negative for color change, pallor and rash  Neurological: Negative for light-headedness, numbness and headaches           Historical Information   Past Medical History:   Diagnosis Date    Gallstone     Hypothyroid     Kidney stones      Past Surgical History:   Procedure Laterality Date    CHOLECYSTECTOMY LAPAROSCOPIC  2018    During pregnancy     Social History   Social History     Substance and Sexual Activity   Alcohol Use Not Currently    Frequency: Never     Social History     Substance and Sexual Activity   Drug Use Never     Social History     Tobacco Use   Smoking Status Never Smoker   Smokeless Tobacco Never Used     Family History: non-contributory    Meds/Allergies      Medications Prior to Admission   Medication    levothyroxine 125 mcg tablet    Prenatal Vit-Fe Fumarate-FA (Prenatal Vitamin) 27-0 8 MG TABS    docusate sodium (COLACE) 100 mg capsule    ferrous sulfate 325 (65 Fe) mg tablet    hydroxyprogesterone caproate (ANA) 250 MG/ML injection        Allergies   Allergen Reactions    Doxycycline GI Intolerance       OBJECTIVE:    Vitals: Blood pressure 119/74, pulse 93, temperature 98 6 °F (37 °C), temperature source Oral, resp  rate 14, height 5' 4" (1 626 m), weight 82 7 kg (182 lb 6 4 oz), last menstrual period 08/27/2020  Body mass index is 31 31 kg/m²  Physical Exam  Exam conducted with a chaperone present  Constitutional:       General: She is not in acute distress  Appearance: She is well-developed  She is not diaphoretic  HENT:      Head: Normocephalic and atraumatic  Eyes:      Pupils: Pupils are equal, round, and reactive to light  Neck:      Musculoskeletal: Normal range of motion  Cardiovascular:      Rate and Rhythm: Normal rate  Pulses: Normal pulses  Pulmonary:      Effort: Pulmonary effort is normal  No respiratory distress  Abdominal:      General: There is no distension  Palpations: Abdomen is soft  There is no mass  Tenderness: There is no abdominal tenderness  There is no guarding  Comments: gravid   Genitourinary:     General: Normal vulva  Musculoskeletal: Normal range of motion  General: No deformity  Skin:     General: Skin is warm and dry     Neurological: Mental Status: She is alert and oriented to person, place, and time  Psychiatric:         Behavior: Behavior normal              Cervix:    5/70/-2    Fetal heart rate:   Baseline Rate: 150 bpm  Variability: Moderate 6-25 bpm  Accelerations: 15 x 15 or greater  Decelerations: None    [place]:   Contraction Frequency (minutes): irregular  Contraction Duration (seconds): 50-70  Contraction Quality: Mild    EFW: 5 5lbs    GBS: Unknown, pending    Prenatal Labs:   Blood Type:   Lab Results   Component Value Date/Time    ABO Grouping O 2021 10:56 AM     , D (Rh type):   Lab Results   Component Value Date/Time    Rh Factor Positive 2021 10:56 AM      , HCT/HGB:   Lab Results   Component Value Date/Time    Hematocrit 37 5 2021 10:48 AM    Hemoglobin 11 3 (L) 2021 10:48 AM      , MCV:   Lab Results   Component Value Date/Time    MCV 83 2021 10:48 AM      , Platelets:   Lab Results   Component Value Date/Time    Platelets 550  10:48 AM      , 1 hour Glucola:   Lab Results   Component Value Date/Time    Glucose 131 2021   , Rubella: immune 10/26/20   , VDRL/RPR:   Lab Results   Component Value Date/Time    RPR Non-Reactive 2021     , Hep B:   Lab Results   Component Value Date/Time    Hepatitis B Surface Ag Negative 10/26/2020       , HIV:   Lab Results   Component Value Date/Time    HIV-1/HIV-2 AB Non-Reactive 10/26/2020     , Chlamydia:   Lab Results   Component Value Date/Time    External Chlamydia Screen neg 10/26/2020     , Gonorrhea: neg    , Group B Strep: collected 5/3/21    Invasive Devices     Peripheral Intravenous Line            Peripheral IV 21 Left Arm 17 days    Peripheral IV 21 Left Arm less than 1 day                  Assessment/Plan     ASSESSMENT:  26yo  at 35w4d weeks gestation who is being admitted for  cervical dilation      PLAN:   1) Admit   2) CBC, RPR, Blood Type collected earlier today   3) Expectant management, IV hydration   4) Follow up GBS status, continue PCN for GBS prophylaxis at    5) Analgesia and/or epidural at patient request   6) Continue to monitor cervical change   7) Discussed with Dr Akila Pablo    This patient will be an INPATIENT  and I certify the anticipated length of stay is >2 Midnights      Cash Das MD  5/3/2021  4:28 PM

## 2021-05-03 NOTE — PROGRESS NOTES
L&D Triage Note - OB/GYN  Se Causey 31 y o  female MRN: [id number]  Unit/Bed#: L&D 326-01 Encounter: [id number]    Se Causey is a patient of Tamara Cruz    RAS: Estimated Date of Delivery: 6/3/21    HPI:  31 y o   35w4d presents after being sent over from the office for suspected  labor  Patient states she began ethel overnight q4-8 minutes  She was evaluated by Dr Florencio Cote at her office this morning  Patient was 3/70/-2  Patient has a history of  vaginal delivery at 35 weeks in G1  She has been on Joan during this pregnancy  She states how she feels today is very similar to her prior labor  She received BTM - due to  contractions  Contractions: yes  Leakage of fluid: no  Vaginal Bleeding: no  Fetal movement: present    Her current obstetrical history is significant for prior  delivery, anemia, hypothyroidism    Her past obstetrical history is significant for prior  delivery at 35 weeks- on Joan       ROS:  Constitutional: Negative  Respiratory: Negative  Cardiovascular: Negative    Gastrointestinal: Negative    OBJECTIVE:  LMP 2020   There is no height or weight on file to calculate BMI      Physical Exam      SVE:  Method: Manual  OB Examiner: DeFruscio    FHT:  Baseline Rate: 160 bpm  Variability: Moderate 6-25 bpm  Accelerations: 15 x 15 or greater  Decelerations: None    TOCO:   Contraction Frequency (minutes): 2-6  Contraction Duration (seconds): 10-30  Contraction Quality: Mild    Labs:   Recent Results (from the past 24 hour(s))   POCT urine dip    Collection Time: 21  7:32 AM   Result Value Ref Range    POCT URINE PROTEIN negative     GLUCOSE, UA negative        Patient is seen by Dr Gustavo Nichols is a 31 y o  Orvil Keri at 35w4d here for r/o  labor     PLAN  #1  R/o PTL  · Collect GBS   · PCN for GBS ppx   · 1L LR bolus   · CBC  · Type and Screen   · RPR   · SVE   · 4-6h observation     Re-evaluate after 4-6 hours and determine further disposition at that time      D/w Dr Sivakumar Laura MD  OB/GYN PGY-1  5/3/2021  10:01 AM

## 2021-05-03 NOTE — TELEPHONE ENCOUNTER
Pt not in L and D  She was asked where she was, she states she went home to grab her bags and eat breakfast but is now 10 mins away

## 2021-05-03 NOTE — PROGRESS NOTES
OB/GYN  PN Visit  Royden Closs  [id number]  5/3/2021  7:26 AM  Dr Rhonda Martino MD    S: 31 y o  [id number] 35w4d here for PN visit  Chief Complaint   Patient presents with    Routine Prenatal Visit     Labs UTD, Already had TDAP, and Due for GBS  Patient with c/o of contractions last night 2:30 am - 4 am that lasted approx 11 min  OB complaints:  Contractions: yes  Leakage: no  Bleeding: no  Fetal movement: yes      O:  /62 (BP Location: Right arm, Cuff Size: Standard)   Pulse (!) 107   Temp 98 °F (36 7 °C) (Temporal)   Wt 82 7 kg (182 lb 6 4 oz)   LMP 08/27/2020   BMI 31 31 kg/m²       Review of Systems   Constitutional: Negative  HENT: Negative  Eyes: Negative  Respiratory: Negative  Cardiovascular: Negative  Gastrointestinal: Negative  Endocrine: Negative  Genitourinary:        As noted in HPI   Musculoskeletal: Negative  Skin: Negative  Allergic/Immunologic: Negative  Neurological: Negative  Hematological: Negative  Psychiatric/Behavioral: Negative  Physical Exam  Constitutional:       General: She is not in acute distress  Appearance: Normal appearance  She is well-developed  Genitourinary:      Pelvic exam was performed with patient in the lithotomy position  No vulval lesion, tenderness, ulcerations, Bartholin's cyst or rash noted  No signs of labial injury  No labial fusion  No posterior fourchette tenderness, injury, rash or lesion present  No inguinal adenopathy present in the right or left side  HENT:      Head: Normocephalic  Cardiovascular:      Rate and Rhythm: Normal rate  Pulmonary:      Effort: Pulmonary effort is normal    Abdominal:      General: There is no distension  Palpations: Abdomen is soft  Tenderness: There is no abdominal tenderness  There is no guarding  Hernia: There is no hernia in the left inguinal area or right inguinal area     Musculoskeletal:         General: No swelling or deformity  Lymphadenopathy:      Lower Body: No right inguinal adenopathy  No left inguinal adenopathy  Neurological:      General: No focal deficit present  Mental Status: She is alert and oriented to person, place, and time  Skin:     General: Skin is warm and dry  Psychiatric:         Mood and Affect: Mood normal          Behavior: Behavior normal    Vitals signs reviewed  Fundal Height (cm): 35 cm  Fetal Heart Rate: 145          A/P:    Problem List Items Addressed This Visit        Endocrine    Hypothyroid in pregnancy, antepartum       Other    Supervision of other normal pregnancy, antepartum    Maternal anemia in pregnancy, antepartum    Previous  delivery, antepartum    35 weeks gestation of pregnancy - Primary    Relevant Orders    Strep B DNA probe, amplification    POCT urine dip        GBS collected  No penicillin allergy    Patient was on 17 hydroxyprogesterone caproate until last Fri, needs more as she ran out of injections - to continue until 36 6/7 weeks    Patient is 3 cm dilated  Advised  A period of observation  At least 4 hours at the hospital to check if  labor progresses  patient should have GBS prophylaxis  For GBS unknown and GBS recollected at the hospital       Follow-up in 4 days  Advised patient to stop working for now unless she is able to work from home                    Simone Mcbride MD  5/3/2021  7:26 AM

## 2021-05-04 ENCOUNTER — TELEPHONE (OUTPATIENT)
Dept: OBGYN CLINIC | Facility: CLINIC | Age: 26
End: 2021-05-04

## 2021-05-04 ENCOUNTER — TELEPHONE (OUTPATIENT)
Dept: OTHER | Facility: OTHER | Age: 26
End: 2021-05-04

## 2021-05-04 ENCOUNTER — TELEPHONE (OUTPATIENT)
Dept: LABOR AND DELIVERY | Facility: HOSPITAL | Age: 26
End: 2021-05-04

## 2021-05-04 ENCOUNTER — HOSPITAL ENCOUNTER (INPATIENT)
Facility: HOSPITAL | Age: 26
LOS: 2 days | Discharge: HOME | End: 2021-05-07
Attending: OBSTETRICS & GYNECOLOGY | Admitting: OBSTETRICS & GYNECOLOGY
Payer: COMMERCIAL

## 2021-05-04 VITALS
SYSTOLIC BLOOD PRESSURE: 125 MMHG | BODY MASS INDEX: 31.14 KG/M2 | HEIGHT: 64 IN | RESPIRATION RATE: 14 BRPM | WEIGHT: 182.4 LBS | HEART RATE: 102 BPM | DIASTOLIC BLOOD PRESSURE: 65 MMHG | TEMPERATURE: 98.4 F

## 2021-05-04 DIAGNOSIS — Z3A.35 35 WEEKS GESTATION OF PREGNANCY: ICD-10-CM

## 2021-05-04 LAB — RPR SER QL: NORMAL

## 2021-05-04 PROCEDURE — NC001 PR NO CHARGE: Performed by: OBSTETRICS & GYNECOLOGY

## 2021-05-04 PROCEDURE — 99215 OFFICE O/P EST HI 40 MIN: CPT

## 2021-05-04 PROCEDURE — G0463 HOSPITAL OUTPT CLINIC VISIT: HCPCS

## 2021-05-04 PROCEDURE — 99217 PR OBSERVATION CARE DISCHARGE MANAGEMENT: CPT | Performed by: STUDENT IN AN ORGANIZED HEALTH CARE EDUCATION/TRAINING PROGRAM

## 2021-05-04 PROCEDURE — G0379 DIRECT REFER HOSPITAL OBSERV: HCPCS

## 2021-05-04 RX ORDER — ONDANSETRON 2 MG/ML
4 INJECTION INTRAMUSCULAR; INTRAVENOUS EVERY 6 HOURS PRN
Status: DISCONTINUED | OUTPATIENT
Start: 2021-05-04 | End: 2021-05-05

## 2021-05-04 RX ORDER — SODIUM CHLORIDE, SODIUM LACTATE, POTASSIUM CHLORIDE, CALCIUM CHLORIDE 600; 310; 30; 20 MG/100ML; MG/100ML; MG/100ML; MG/100ML
125 INJECTION, SOLUTION INTRAVENOUS CONTINUOUS
Status: DISCONTINUED | OUTPATIENT
Start: 2021-05-04 | End: 2021-05-05

## 2021-05-04 RX ADMIN — LEVOTHYROXINE SODIUM 125 MCG: 25 TABLET ORAL at 06:19

## 2021-05-04 RX ADMIN — SODIUM CHLORIDE 2.5 MILLION UNITS: 9 INJECTION, SOLUTION INTRAVENOUS at 04:16

## 2021-05-04 RX ADMIN — SODIUM CHLORIDE 5 MILLION UNITS: 0.9 INJECTION, SOLUTION INTRAVENOUS at 20:24

## 2021-05-04 RX ADMIN — SODIUM CHLORIDE, SODIUM LACTATE, POTASSIUM CHLORIDE, AND CALCIUM CHLORIDE 125 ML/HR: .6; .31; .03; .02 INJECTION, SOLUTION INTRAVENOUS at 20:23

## 2021-05-04 NOTE — PROGRESS NOTES
Progress Note - Antepartum   Angeli Soto 31 y o  female MRN: [id number]  Unit/Bed#: L&D 326-01 Encounter: [id number]    Assessment:  31 y o   at 35w5d admitted with  contractions and cervical dilation, with contractions now subsided and cervical exam stable at 5cm dilation  Hospital day 2    Plan:  Stable for discharge at this time  Betamethasone administered -  Regular diet  SLIV, discontinue penicillin  Follow up GBS culture    Discussed signs and symptoms of early labor  Next appointment with her Ob/Gyn office 21    Subjective/Objective   Subjective:     [address] reports her contractions have now subsided, and while she still feels them intermittently, they are irregular and mild  She was able to sleep some overnight  She had a small amount of vaginal spotting last night once, possibly related to cervical exams yesterday  No leakage of fluid, and she continues to have good fetal movement  Pain: no  Tolerating PO: yes  Voiding: yes  Ambulating: yes  Headaches: no  Visual changes: no  Chest pain: no  Shortness of breath: no  Nausea: no  Vomiting/Diarrhea: no  Dysuria: no  Leg pain: no    Objective:     Vitals:   Temp:  [98 °F (36 7 °C)-98 6 °F (37 °C)] 98 2 °F (36 8 °C)  HR:  [] 94  Resp:  [14-18] 14  BP: ()/(52-74) 96/52       Physical Exam  Constitutional:       General: She is not in acute distress  Appearance: Normal appearance  She is not ill-appearing or toxic-appearing  HENT:      Head: Normocephalic and atraumatic  Eyes:      General: No scleral icterus  Conjunctiva/sclera: Conjunctivae normal    Cardiovascular:      Rate and Rhythm: Normal rate and regular rhythm  Pulses: Normal pulses  Pulmonary:      Effort: Pulmonary effort is normal  No respiratory distress  Abdominal:      General: There is no distension  Palpations: Abdomen is soft  Tenderness: There is no abdominal tenderness  There is no guarding        Comments: gravid Musculoskeletal: Normal range of motion  Right lower leg: No edema  Left lower leg: No edema  Skin:     General: Skin is warm and dry  Neurological:      General: No focal deficit present  Mental Status: She is alert  Mental status is at baseline     Psychiatric:         Mood and Affect: Mood normal          Behavior: Behavior normal          SVE 5/70/-2    Fetal Assessment:  FHT: 140 / Moderate 6 - 25 bpm / Accelerations present, no decelerations, reactive  [place]: irregular, difficult to trace while on her side    Lab, Imaging and other studies:  Lab Results   Component Value Date    WBC 7 15 05/03/2021    HGB 11 3 (L) 05/03/2021    HCT 37 5 05/03/2021    MCV 83 05/03/2021     05/03/2021       Peterson Leal MD  OBN, R-2  5/4/2021  6:11 AM

## 2021-05-04 NOTE — TELEPHONE ENCOUNTER
Pt having back pain and was advanced dilatation   Will come in to be checked to see if further dilated or in labor

## 2021-05-04 NOTE — DISCHARGE INSTRUCTIONS
Early Labor Signs   WHAT YOU SHOULD KNOW:   Early labor signs are changes in your body that allow your baby to pass through your birth canal   INSTRUCTIONS:   Signs and symptoms of early labor:   · Lightening  occurs when your baby drops inside your pelvis  You may feel increased pressure in your pelvis  This may happen a few weeks to a few hours before your labor begins  · Contractions  are cramps and tightening that occur in your uterus to help move the baby through your birth canal  Contractions occur regularly and more often each time  Each one lasts about 30 to 70 seconds, and gets stronger and more painful until you deliver your baby  Contractions do not go away with movement  They start in your lower back and move to the front in your abdomen  · Effacement  occurs when your cervix softens and thins, so it can easily open for the baby  Your primary healthcare provider [facility] or obstetrician will examine your cervix for effacement  · Dilation  is widening of your cervix, also for the baby's passage  Your PHP or obstetrician will examine your cervix for dilation  Your cervix will be fully opened and ready for delivery when it is dilated to 10 centimeters  · Increased discharge  from your vagina may occur  It may be pink, clear, or slightly bloody  This discharge may also be called bloody show  Bloody show is a mucus plug that forms and blocks your cervix during pregnancy  · Rupture of membranes  is a sudden release of clear fluid from your vagina  It is also known as when your water breaks  Your PHP or obstetrician may need to break your water if it does not break on its own  False labor: You may have false labor signs, which are also called [place] contractions  False labor is common and may happen several weeks or days before your actual labor  The contractions are not regular, and do not get closer together  The pain is usually mild, does not worsen, and is felt only in front   Joel Mendes contractions may happen later in the day, and stop after you change position, walk, or rest   Contact your PHP or obstetrician if:   · You have pain in your lower back or abdomen  · You have bloody mucus or show  · You have questions or concerns about your condition or care  Return to the emergency department if:   · You have regular, painful contractions that are less than 5 minutes apart, and last 30 to 70 seconds each  · You have heavy vaginal bleeding  · You have a constant trickle or sudden gush of clear fluid from your vagina  · You notice a sudden decrease in your baby's movement  © 2014 [employer] is for End User's use only and may not be sold, redistributed or otherwise used for commercial purposes  All illustrations and images included in CareNotes® are the copyrighted property of [employer]  or Andrzej Kwan  The above information is an  only  It is not intended as medical advice for individual conditions or treatments  Talk to your doctor, nurse or pharmacist before following any medical regimen to see if it is safe and effective for you

## 2021-05-04 NOTE — TELEPHONE ENCOUNTER
Patient was discharged from the hospital not in  labor but with advanced cervical dilation  Patient feels well  Discussed with patient strict labor precautions and proceed to the hospital if contractions return  Patient was advised to follow up in the office tomorrow at 8:00 a m  Miguel Moyer Patient  Requested a note for work which was sent via my chart    Patient was advised she may start work remotely on May 6

## 2021-05-04 NOTE — TELEPHONE ENCOUNTER
Patient called because she was discharged being 5 cm dilated  She was calling because her contractions are 4 5 minutes apart and is having back pain  Informed caller to call back if they do not receive a call back from a provider within 10-15 minutes

## 2021-05-04 NOTE — LETTER
May 4, 2021     Patient: Cassie Chapa   YOB: 1995   Date of Visit: 5/4/2021       To Whom it May Concern:    Cassie Chapa is under my professional care  She was seen in my office on 5/3/2021 and was admitted at the hospital from 5/3/21-5/4/21  She was advised to stop working in person as of 5/3/21  If patient can be accommodated to work from home, she can return to work remotely on 5/6/21  If you have any questions or concerns, please don't hesitate to call           Sincerely,          Chidi Mckeon MD        CC: No Recipients

## 2021-05-05 PROBLEM — O60.03 PRETERM LABOR IN THIRD TRIMESTER: Status: ACTIVE | Noted: 2021-05-05

## 2021-05-05 LAB
ABO GROUP BLD: NORMAL
ANISOCYTOSIS BLD QL SMEAR: PRESENT
BASE EXCESS BLDCOA CALC-SCNC: 0.6 MMOL/L (ref 3–11)
BASE EXCESS BLDCOV CALC-SCNC: 0.2 MMOL/L (ref 1–9)
BASOPHILS # BLD MANUAL: 0 THOUSAND/UL (ref 0–0.1)
BASOPHILS NFR MAR MANUAL: 0 % (ref 0–1)
BLD GP AB SCN SERPL QL: NEGATIVE
EOSINOPHIL # BLD MANUAL: 0 THOUSAND/UL (ref 0–0.4)
EOSINOPHIL NFR BLD MANUAL: 0 % (ref 0–6)
ERYTHROCYTE [DISTWIDTH] IN BLOOD BY AUTOMATED COUNT: 24.5 % (ref 11.6–15.1)
GP B STREP DNA SPEC QL NAA+PROBE: NEGATIVE
HCO3 BLDCOA-SCNC: 26.9 MMOL/L (ref 17.3–27.3)
HCO3 BLDCOV-SCNC: 23.8 MMOL/L (ref 12.2–28.6)
HCT VFR BLD AUTO: 37 % (ref 34.8–46.1)
HGB BLD-MCNC: 11.2 G/DL (ref 11.5–15.4)
LG PLATELETS BLD QL SMEAR: PRESENT
LYMPHOCYTES # BLD AUTO: 1.29 THOUSAND/UL (ref 0.6–4.47)
LYMPHOCYTES # BLD AUTO: 20 % (ref 14–44)
MCH RBC QN AUTO: 24.9 PG (ref 26.8–34.3)
MCHC RBC AUTO-ENTMCNC: 30.3 G/DL (ref 31.4–37.4)
MCV RBC AUTO: 82 FL (ref 82–98)
MONOCYTES # BLD AUTO: 0.71 THOUSAND/UL (ref 0–1.22)
MONOCYTES NFR BLD: 11 % (ref 4–12)
NEUTROPHILS # BLD MANUAL: 4.46 THOUSAND/UL (ref 1.85–7.62)
NEUTS BAND NFR BLD MANUAL: 1 % (ref 0–8)
NEUTS SEG NFR BLD AUTO: 68 % (ref 43–75)
NRBC BLD AUTO-RTO: 0 /100 WBCS
O2 CT VFR BLDCOA CALC: 13.5 ML/DL
OVALOCYTES BLD QL SMEAR: PRESENT
OXYHGB MFR BLDCOA: 58 %
OXYHGB MFR BLDCOV: 80.1 %
PCO2 BLDCOA: 48.8 MM[HG] (ref 30–60)
PCO2 BLDCOV: 35.5 MM HG (ref 27–43)
PH BLDCOA: 7.36 [PH] (ref 7.23–7.43)
PH BLDCOV: 7.44 [PH] (ref 7.19–7.49)
PLATELET # BLD AUTO: 230 THOUSANDS/UL (ref 149–390)
PLATELET BLD QL SMEAR: ADEQUATE
PMV BLD AUTO: 9.9 FL (ref 8.9–12.7)
PO2 BLDCOA: 24.7 MM HG (ref 5–25)
PO2 BLDCOV: 33.5 MM HG (ref 15–45)
POLYCHROMASIA BLD QL SMEAR: PRESENT
RBC # BLD AUTO: 4.49 MILLION/UL (ref 3.81–5.12)
RH BLD: POSITIVE
SAO2 % BLDCOV: 16.2 ML/DL
SPECIMEN EXPIRATION DATE: NORMAL
TOTAL CELLS COUNTED SPEC: 100
WBC # BLD AUTO: 6.47 THOUSAND/UL (ref 4.31–10.16)

## 2021-05-05 PROCEDURE — 86850 RBC ANTIBODY SCREEN: CPT | Performed by: OBSTETRICS & GYNECOLOGY

## 2021-05-05 PROCEDURE — G0463 HOSPITAL OUTPT CLINIC VISIT: HCPCS

## 2021-05-05 PROCEDURE — 86592 SYPHILIS TEST NON-TREP QUAL: CPT | Performed by: OBSTETRICS & GYNECOLOGY

## 2021-05-05 PROCEDURE — 86900 BLOOD TYPING SEROLOGIC ABO: CPT | Performed by: OBSTETRICS & GYNECOLOGY

## 2021-05-05 PROCEDURE — 10907ZC DRAINAGE OF AMNIOTIC FLUID, THERAPEUTIC FROM PRODUCTS OF CONCEPTION, VIA NATURAL OR ARTIFICIAL OPENING: ICD-10-PCS | Performed by: OBSTETRICS & GYNECOLOGY

## 2021-05-05 PROCEDURE — 85027 COMPLETE CBC AUTOMATED: CPT | Performed by: OBSTETRICS & GYNECOLOGY

## 2021-05-05 PROCEDURE — 88307 TISSUE EXAM BY PATHOLOGIST: CPT | Performed by: PATHOLOGY

## 2021-05-05 PROCEDURE — 99215 OFFICE O/P EST HI 40 MIN: CPT

## 2021-05-05 PROCEDURE — 86901 BLOOD TYPING SEROLOGIC RH(D): CPT | Performed by: OBSTETRICS & GYNECOLOGY

## 2021-05-05 PROCEDURE — 85007 BL SMEAR W/DIFF WBC COUNT: CPT | Performed by: OBSTETRICS & GYNECOLOGY

## 2021-05-05 PROCEDURE — 4A1HXCZ MONITORING OF PRODUCTS OF CONCEPTION, CARDIAC RATE, EXTERNAL APPROACH: ICD-10-PCS | Performed by: OBSTETRICS & GYNECOLOGY

## 2021-05-05 PROCEDURE — 82805 BLOOD GASES W/O2 SATURATION: CPT | Performed by: OBSTETRICS & GYNECOLOGY

## 2021-05-05 PROCEDURE — 59400 OBSTETRICAL CARE: CPT | Performed by: OBSTETRICS & GYNECOLOGY

## 2021-05-05 PROCEDURE — NC001 PR NO CHARGE: Performed by: OBSTETRICS & GYNECOLOGY

## 2021-05-05 RX ORDER — SODIUM CHLORIDE, SODIUM LACTATE, POTASSIUM CHLORIDE, CALCIUM CHLORIDE 600; 310; 30; 20 MG/100ML; MG/100ML; MG/100ML; MG/100ML
125 INJECTION, SOLUTION INTRAVENOUS CONTINUOUS
Status: DISCONTINUED | OUTPATIENT
Start: 2021-05-05 | End: 2021-05-07 | Stop reason: HOSPADM

## 2021-05-05 RX ORDER — BUPIVACAINE HYDROCHLORIDE 2.5 MG/ML
INJECTION, SOLUTION EPIDURAL; INFILTRATION; INTRACAUDAL
Status: DISCONTINUED
Start: 2021-05-05 | End: 2021-05-05 | Stop reason: WASHOUT

## 2021-05-05 RX ORDER — DIAPER,BRIEF,INFANT-TODD,DISP
1 EACH MISCELLANEOUS 4 TIMES DAILY PRN
Status: DISCONTINUED | OUTPATIENT
Start: 2021-05-05 | End: 2021-05-07 | Stop reason: HOSPADM

## 2021-05-05 RX ORDER — IBUPROFEN 600 MG/1
600 TABLET ORAL EVERY 6 HOURS PRN
Status: DISCONTINUED | OUTPATIENT
Start: 2021-05-05 | End: 2021-05-07 | Stop reason: HOSPADM

## 2021-05-05 RX ORDER — OXYTOCIN/RINGER'S LACTATE 30/500 ML
250 PLASTIC BAG, INJECTION (ML) INTRAVENOUS CONTINUOUS
Status: DISCONTINUED | OUTPATIENT
Start: 2021-05-05 | End: 2021-05-07 | Stop reason: HOSPADM

## 2021-05-05 RX ORDER — ACETAMINOPHEN 325 MG/1
650 TABLET ORAL EVERY 4 HOURS PRN
Status: DISCONTINUED | OUTPATIENT
Start: 2021-05-05 | End: 2021-05-07 | Stop reason: HOSPADM

## 2021-05-05 RX ORDER — DIPHENHYDRAMINE HCL 25 MG
25 TABLET ORAL EVERY 6 HOURS PRN
Status: DISCONTINUED | OUTPATIENT
Start: 2021-05-05 | End: 2021-05-07 | Stop reason: HOSPADM

## 2021-05-05 RX ORDER — ONDANSETRON 2 MG/ML
4 INJECTION INTRAMUSCULAR; INTRAVENOUS EVERY 8 HOURS PRN
Status: DISCONTINUED | OUTPATIENT
Start: 2021-05-05 | End: 2021-05-07 | Stop reason: HOSPADM

## 2021-05-05 RX ORDER — OXYTOCIN/RINGER'S LACTATE 30/500 ML
PLASTIC BAG, INJECTION (ML) INTRAVENOUS
Status: COMPLETED
Start: 2021-05-05 | End: 2021-05-05

## 2021-05-05 RX ORDER — DOCUSATE SODIUM 100 MG/1
100 CAPSULE, LIQUID FILLED ORAL 2 TIMES DAILY
Status: DISCONTINUED | OUTPATIENT
Start: 2021-05-05 | End: 2021-05-07 | Stop reason: HOSPADM

## 2021-05-05 RX ORDER — CALCIUM CARBONATE 200(500)MG
1000 TABLET,CHEWABLE ORAL DAILY PRN
Status: DISCONTINUED | OUTPATIENT
Start: 2021-05-05 | End: 2021-05-07 | Stop reason: HOSPADM

## 2021-05-05 RX ADMIN — IBUPROFEN 600 MG: 600 TABLET ORAL at 18:27

## 2021-05-05 RX ADMIN — WITCH HAZEL 1 PAD: 500 SOLUTION RECTAL; TOPICAL at 18:56

## 2021-05-05 RX ADMIN — SODIUM CHLORIDE, SODIUM LACTATE, POTASSIUM CHLORIDE, AND CALCIUM CHLORIDE 125 ML/HR: .6; .31; .03; .02 INJECTION, SOLUTION INTRAVENOUS at 14:51

## 2021-05-05 RX ADMIN — LEVOTHYROXINE SODIUM 125 MCG: 25 TABLET ORAL at 05:54

## 2021-05-05 RX ADMIN — SODIUM CHLORIDE 2.5 MILLION UNITS: 9 INJECTION, SOLUTION INTRAVENOUS at 00:01

## 2021-05-05 RX ADMIN — ACETAMINOPHEN 650 MG: 325 TABLET ORAL at 17:13

## 2021-05-05 RX ADMIN — SODIUM CHLORIDE 2.5 MILLION UNITS: 9 INJECTION, SOLUTION INTRAVENOUS at 04:08

## 2021-05-05 RX ADMIN — BENZOCAINE AND LEVOMENTHOL: 200; 5 SPRAY TOPICAL at 18:56

## 2021-05-05 RX ADMIN — Medication 250 UNITS: at 16:49

## 2021-05-05 RX ADMIN — ACETAMINOPHEN 650 MG: 325 TABLET ORAL at 22:20

## 2021-05-05 NOTE — DISCHARGE SUMMARY
Discharge Summary - OB/GYN  Dmitriy Michael 31 y o  female MRN: [id number]  Unit/Bed#: L&D 325-01 Encounter: [id number]    Admission Date: 2021     Discharge Date: 2021    Admitting Attending: Tierney Fry Attending: [address]  Discharging Attending: Leena Riojas    Diagnoses:    labor in pregnancy at 35w  Hypothyroidism    Procedures: spontaneous vaginal delivery    Anesthesia: none    Hospital course: Dmitriy Michael is a 31 y o   at 35w6d who was initially admitted for advanced cervical dilation  She received a full course of betamethasone prior to delivery  She had artificial rupture of membranes performed  She progressed to complete cervical dilation and began pushing  She delivered a viable female  on 2021 @ 1645 via normal spontaneous vaginal delivery over an intact perineum  Birth weight 6lbs 7oz  Apgars were 8 (1 min) and 9 (5 min)   was transferred to the  nursery  Patient tolerated the procedure well  Her post-delivery course was uncomplicated  Her postpartum pain was well controlled with oral analgesics  On day of discharge, she was ambulating and able to reasonably perform all ADLs  She was voiding and had appropriate bowel function  Pain was well controlled  She was discharged home on postpartum day #2 without complications  Patient was instructed to follow up with her OBGYN as an outpatient and was given appropriate warnings to call provider if she develops signs of infection or uncontrolled pain  Complications: none apparent    Condition at discharge: stable     Provisions for Follow-Up Care:  See after visit summary for information related to follow-up care and any pertinent home health orders  Disposition: Home    Planned Readmission: No    Discharge Medications:   Please see AVS for a complete list of discharge medications      Discharge instructions :   -Do not place anything (no intercourse, tampons or douche) in your vagina for at least 6 weeks  -You may walk for exercise for the first 6 weeks then gradually return to your usual activities    -You may take baths or shower per your preference    -Please look at your bust (breasts) in the mirror daily and call provider for redness or tenderness or increased warmth    -Please call your provider if temperature > 100 4*F or 38* C, worsening pain or a foul discharge   -Please follow up in 4-6 weeks for your postpartum visit  Xuan Hare MD  PGY-1, OB/GYN  5/7/2021, 7:05 AM

## 2021-05-05 NOTE — PLAN OF CARE
Problem: Knowledge Deficit  Goal: Verbalizes understanding of labor plan  Description: Assess patient/family/caregiver's baseline knowledge level and ability to understand information  Provide education via patient/family/caregiver's preferred learning method at appropriate level of understanding  1  Provide teaching at level of understanding  2  Provide teaching via preferred learning method(s)  Outcome: Progressing     Problem: Labor & Delivery  Goal: Manages discomfort  Description: Assess and monitor for signs and symptoms of discomfort  Assess patient's pain level regularly and per hospital policy  Administer medications as ordered  Support use of nonpharmacological methods to help control pain such as distraction, imagery, relaxation, and application of heat and cold  Collaborate with interdisciplinary team and patient to determine appropriate pain management plan  1  Include patient in decisions related to comfort  2  Offer non-pharmacological pain management interventions  3  Report ineffective pain management to physician  Outcome: Progressing  Goal: Patient vital signs are stable  Description: 1  Assess vital signs - vaginal delivery    Outcome: Progressing     Problem: ANTEPARTUM  Goal: Maintain pregnancy as long as maternal and/or fetal condition is stable  Description: INTERVENTIONS:  - Maternal surveillance  - Fetal surveillance  - Monitor uterine activity  - Medications as ordered  - Bedrest  Outcome: Progressing     Problem: BIRTH - VAGINAL/ SECTION  Goal: Fetal and maternal status remain reassuring during the birth process  Description: INTERVENTIONS:  - Monitor vital signs  - Monitor fetal heart rate  - Monitor uterine activity  - Monitor labor progression (vaginal delivery)  - DVT prophylaxis  - Antibiotic prophylaxis  Outcome: Progressing  Goal: Emotionally satisfying birthing experience for mother/fetus  Description: Interventions:  - Assess, plan, implement and evaluate the nursing care given to the patient in labor  - Advocate the philosophy that each childbirth experience is a unique experience and support the family's chosen level of involvement and control during the labor process   - Actively participate in both the patient's and family's teaching of the birth process  - Consider cultural, Quaker and age-specific factors and plan care for the patient in labor  Outcome: Progressing

## 2021-05-05 NOTE — L&D DELIVERY NOTE
Delivery Summary - OB/GYN   Alexy Estes 31 y o  female MRN: [id number]  Unit/Bed#: L&D 325-01 Encounter: [id number]    Pre-delivery Diagnosis:   1  35w6d pregnancy  2   Labor  3  Hx  Delivery  4  GBS negative status    Post-delivery Diagnosis: same, delivered    Attending: Francis Pulido MD    Assistant(s): Delmy MAURICE    Procedure:     Anesthesia: none    Quantified blood loss (mL): 145    Specimens:   1  Arterial and venous cord gases  2  Cord blood  3  Segment of umbilical cord  4  Placenta to pathology    Complications:  None apparent    Findings:  1  Viable female  delivered on 21 at 1645 weight pending;  Apgar scores of 8 at one minute and 9 at five minutes  2  Spontaneous delivery of placenta with centrally inserted 3-vessel cord  3  Arterial and venous cord gases of 7 359 and 7 444       Disposition: Patient tolerated the procedure well and was recovering in labor and delivery room with family and  before being transferred to the post-partum floor  Procedure Details     Description of procedure    After pushing for 5 minutes, on 21 at [facility] 81  patient delivered a viable female , weight pending, Apgars of 8 (1 min) and 9 (5 min)  The fetal vertex delivered spontaneously  There was no nuchal cord  The anterior shoulder delivered atraumatically with maternal expulsive forces and the assistance of downward traction  The posterior shoulder delivered with maternal expulsive forces and the assistance of upward traction  The remainder of the fetus delivered spontaneously  Upon delivery, the infant was placed on the mothers abdomen and the cord was clamped and cut  The infant was noted to cry spontaneously and was moving all extremities appropriately  There was no evidence for injury  Awaiting nurse resuscitators evaluated the  at bedside  Arterial and venous cord blood gases and cord blood was collected for analysis   These were promptly sent to the lab  In the immediate post-partum, 30 units of IV pitocin was administered and the uterus was noted to contract down well with massage and pitocin  The placenta delivered spontaneously at 1650 and was noted to have a centrally inserted 3 vessel cord  The vagina, cervix, and perineum were inspected and there was noted to be no lacerations  At the conclusion of the delivery, all needle, sponge, and instrument counts were noted to be correct  Patient tolerated the procedure well and was allowed to recover in labor and delivery room with family and  before being transferred to the post-partum floor  Dr Erinn Davis was present and participated in all key portions of the case      [place], DO

## 2021-05-05 NOTE — QUICK NOTE
Bimanual exam performed due to increased postpartum bleeding  Medium sized clots noted to be sitting in the ANGELINA and extracted  ANGELINA was noted to then firm up more, fundus firm  Will monitor bleeding

## 2021-05-05 NOTE — PLAN OF CARE
Problem: Knowledge Deficit  Goal: Verbalizes understanding of labor plan  Description: Assess patient/family/caregiver's baseline knowledge level and ability to understand information  Provide education via patient/family/caregiver's preferred learning method at appropriate level of understanding  1  Provide teaching at level of understanding  2  Provide teaching via preferred learning method(s)  Outcome: Completed     Problem: Labor & Delivery  Goal: Manages discomfort  Description: Assess and monitor for signs and symptoms of discomfort  Assess patient's pain level regularly and per hospital policy  Administer medications as ordered  Support use of nonpharmacological methods to help control pain such as distraction, imagery, relaxation, and application of heat and cold  Collaborate with interdisciplinary team and patient to determine appropriate pain management plan  1  Include patient in decisions related to comfort  2  Offer non-pharmacological pain management interventions  3  Report ineffective pain management to physician  Outcome: Completed  Goal: Patient vital signs are stable  Description: 1  Assess vital signs - vaginal delivery    Outcome: Completed     Problem: ANTEPARTUM  Goal: Maintain pregnancy as long as maternal and/or fetal condition is stable  Description: INTERVENTIONS:  - Maternal surveillance  - Fetal surveillance  - Monitor uterine activity  - Medications as ordered  - Bedrest  Outcome: Completed     Problem: BIRTH - VAGINAL/ SECTION  Goal: Fetal and maternal status remain reassuring during the birth process  Description: INTERVENTIONS:  - Monitor vital signs  - Monitor fetal heart rate  - Monitor uterine activity  - Monitor labor progression (vaginal delivery)  - DVT prophylaxis  - Antibiotic prophylaxis  Outcome: Completed  Goal: Emotionally satisfying birthing experience for mother/fetus  Description: Interventions:  - Assess, plan, implement and evaluate the nursing care given to the patient in labor  - Advocate the philosophy that each childbirth experience is a unique experience and support the family's chosen level of involvement and control during the labor process   - Actively participate in both the patient's and family's teaching of the birth process  - Consider cultural, Sabianism and age-specific factors and plan care for the patient in labor  Outcome: Completed

## 2021-05-05 NOTE — OB LABOR/OXYTOCIN SAFETY PROGRESS
Labor Progress Note - Curly Salazar 31 y o  female MRN: [id number]    Unit/Bed#: L&D 325-01 Encounter: [id number]       Contraction Frequency (minutes): 4-6  Contraction Quality: Moderate  Tachysystole: No   Cervical Dilation: 9        Cervical Effacement: 100  Fetal Station: -1  Baseline Rate: 140 bpm                     Vital Signs:   Vitals:    05/05/21 1443   BP:    Pulse:    Resp:    Temp: 98 4 °F (36 9 °C)           Notes/comments:   Cervical change appreciated  [place] appreciated, another AROM performed  Continue current management       Glynn Connell DO 5/5/2021 3:58 PM

## 2021-05-05 NOTE — PROGRESS NOTES
Progress Note - Neil Salazar 31 y o  female MRN: [id number]  Unit/Bed#: L&D 325-01 Encounter: [id number]    Assessment:  31 y o   at 35w6d admitted overnight for observation in the setting of  contractions with advanced cervical dilation  Overnight she made slight change to /-2  Prior run of intermittent late decelerations between 12:30 -1:30 AM that have resolved aside from a deceleration this AM at 0635  Plan:    Advanced cervical dilation and possible PTL at 35w6d  - Will recheck cervix later this morning  - Continuous monitoring for now, if decelerations continue then may recommend augmentation for intended delivery  - Continue PCN for now since GBS still pending   - S/p BTM -      Subjective:   Patient reports that contractions are less intense than when she initially presented  She was able to sleep soundly through them  Denies LOF, VB, and continues to report good FM  Objective:     Vitals:   Temp:  [97 9 °F (36 6 °C)-98 5 °F (36 9 °C)] 97 9 °F (36 6 °C)  HR:  [] 99  Resp:  [14-18] 18  BP: (100-134)/(51-69) 100/51       Physical Exam  Constitutional:       General: She is not in acute distress  Appearance: She is not ill-appearing or toxic-appearing  HENT:      Head: Normocephalic and atraumatic  Pulmonary:      Effort: Pulmonary effort is normal  No respiratory distress  Skin:     General: Skin is warm and dry  Neurological:      General: No focal deficit present  Mental Status: She is alert and oriented to person, place, and time  Mental status is at baseline  Psychiatric:         Mood and Affect: Mood normal          Thought Content:  Thought content normal          Judgment: Judgment normal              Lab Results   Component Value Date    WBC 7 15 2021    HGB 11 3 (L) 2021    HCT 37 5 2021    MCV 83 2021     2021       No results found for: GLUCOSE, CALCIUM, NA, K, CO2, CL, BUN, CREATININE    No results found for: ALT, AST, GGT, ALKPHOS, BILITOT    Homero Russell MD  5/5/2021  6:01 AM

## 2021-05-05 NOTE — H&P
H&P Exam - Obstetrics   Rene Mcallister 31 y o  female MRN: [id number]  Unit/Bed#: L&D 325-01 Encounter: [id number]      ASSESSMENT:  32yo  at 35w5d weeks gestation who is being admitted for observation in the setting of advanced cervical dilation and possible  labor  Was 570/-2 this AM and now 680/-2, cervix anterior, soft and stretchy  PLAN:    Advanced cervical dilation and possible PTL at 35w5d  Admit for observation   Plan for 2 hour recheck   PCN in case in PTL, GBS pending   S/p BTM -  CBC, RPR, Blood Type done yesterday     Discussed with Dr Mena Wen       This patient will be an OBSERVATION       History of Present Illness     Chief Complaint: Contractions    HPI:  Rene Mcallister is a 31 y o   female with an RAS of 6/3/2021, by Last Menstrual Period at 35w5d weeks gestation who is being admitted for  contractions and advanced cervical dilation  She was recently discharged this morning from observation after making change from 3 to 5 cm dilation  She received PCN at that time and was discharged home with strict precautions since she lived close and contractions spaced out with no further dilation  She states that this evening the contractions became more frequent and less than 5 minutes apart  She denies LOF, VB, and reports good fetal movement  Her obstetric history is significant for a   at 35w6d and has been getting Joan injections  She is Dr Constanza Durham patient  PREGNANCY COMPLICATIONS:   1  H/o  delivery at 35w6d, on [place] injections  2  Anemia in pregnancy (Hgb 11 3)  3   Hypothyroidism, on levothyroxine     OB History    Para Term  AB Living   2 1   1   1   SAB TAB Ectopic Multiple Live Births           1      # Outcome Date GA Lbr Ruiz/2nd Weight Sex Delivery Anes PTL Lv   2 Current            1  /18 35w6d  2821 g (6 lb 3 5 oz) F Vag-Spont None Y CHANTELLE      Complications:  labor       Baby complications/comments: None, last u/s 4/19 showed EFW 4lbs 14oz (41%)     Review of Systems   Constitutional: Negative for chills and fever  Eyes: Negative for visual disturbance  Respiratory: Negative for cough and shortness of breath  Cardiovascular: Negative for chest pain, palpitations and leg swelling  Gastrointestinal: Negative for abdominal pain, diarrhea, nausea and vomiting  Genitourinary: Negative for dysuria, hematuria, pelvic pain, vaginal bleeding, vaginal discharge and vaginal pain  Neurological: Negative for dizziness, light-headedness and headaches  Historical Information   Past Medical History:   Diagnosis Date    Gallstone     Hypothyroid     Kidney stones      Past Surgical History:   Procedure Laterality Date    CHOLECYSTECTOMY LAPAROSCOPIC  2018    During pregnancy     Social History   Social History     Substance and Sexual Activity   Alcohol Use Not Currently    Frequency: Never     Social History     Substance and Sexual Activity   Drug Use Never     Social History     Tobacco Use   Smoking Status Never Smoker   Smokeless Tobacco Never Used     Family History: non-contributory    Meds/Allergies      Medications Prior to Admission   Medication    levothyroxine 125 mcg tablet    Prenatal Vit-Fe Fumarate-FA (Prenatal Vitamin) 27-0 8 MG TABS        Allergies   Allergen Reactions    Doxycycline GI Intolerance       OBJECTIVE:    Vitals: Last menstrual period 08/27/2020  There is no height or weight on file to calculate BMI  Physical Exam  Constitutional:       General: She is not in acute distress  Appearance: She is well-developed  HENT:      Head: Normocephalic and atraumatic  Cardiovascular:      Rate and Rhythm: Normal rate and regular rhythm  Heart sounds: Normal heart sounds  Pulmonary:      Effort: Pulmonary effort is normal  No respiratory distress  Breath sounds: Normal breath sounds  Abdominal:      Tenderness:  There is no abdominal tenderness  There is no guarding  Comments: Gravid uterus   Musculoskeletal:         General: No tenderness  Skin:     General: Skin is warm and dry  Neurological:      General: No focal deficit present  Mental Status: She is alert and oriented to person, place, and time  Mental status is at baseline  Psychiatric:         Mood and Affect: Mood normal          Behavior: Behavior normal          Thought Content:  Thought content normal          Judgment: Judgment normal        Cervix:   6/80/-2    Fetal heart rate:    155 BPM, reactive with moderate variability, no decelerations     [place]:    Irregular     GBS: Pending     Prenatal Labs:   Blood Type:   Lab Results   Component Value Date/Time    ABO Grouping O 05/03/2021 10:56 AM     , D (Rh type):   Lab Results   Component Value Date/Time    Rh Factor Positive 05/03/2021 10:56 AM    HCT/HGB:   Lab Results   Component Value Date/Time    Hematocrit 37 5 05/03/2021 10:48 AM    Hemoglobin 11 3 (L) 05/03/2021 10:48 AM      , MCV:   Lab Results   Component Value Date/Time    MCV 83 05/03/2021 10:48 AM      , Platelets:   Lab Results   Component Value Date/Time    Platelets 752 94/16/5491 10:48 AM      , 1 hour Glucola:   Lab Results   Component Value Date/Time    Glucose 131 02/28/2021    Rubella: Immune  VDRL/RPR:   Lab Results   Component Value Date/Time    RPR Non-Reactive 05/03/2021 10:48 AM      , Hep B:   Lab Results   Component Value Date/Time    Hepatitis B Surface Ag Negative 10/26/2020   HIV:   Lab Results   Component Value Date/Time    HIV-1/HIV-2 AB Non-Reactive 10/26/2020     , Chlamydia:   Lab Results   Component Value Date/Time    External Chlamydia Screen neg 10/26/2020     , Gonorrhea: Negative     Invasive Devices     None

## 2021-05-05 NOTE — UTILIZATION REVIEW
Initial Clinical Review OBSERVATION ADMISSION 2021 CONVERTED TO INPATIENT ADMISSION 2021 1010 DUE TO ADVANCED CERVICAL DILATATION  WITH  LABOR   Admission Orders (From admission, onward)     Ordered        21 1010  Inpatient Admission  Once         21  Place in Observation  Once                Admission: Date/Time/Statement:        21 1003  Inpatient Admission (Inpatient Admission) Once     Question Answer Comment   Level of Care Med Surg    Estimated length of stay More than 2 Midnights    Certification I certify that inpatient services are medically necessary for this patient for a duration of greater than two midnights  See H&P and MD Progress Notes for additional information about the patient's course of treatment  21 1010     ED Arrival Information     Patient not seen in ED                     Chief Complaint   Patient presents with    Contractions       Initial Presentation:  ON 2021 26yo  at 35w5d weeks gestation  Observation admission CONVERTED TO INPATIENT ADMISSION  in the setting of advanced cervical dilation and possible  labor  Presented 5/3/2021 for same issue had dilated from 3 cm> 5 cm & stabilized  She states that this evening the contractions became more frequent and less than 5 minutes apart  Was 5/70/-2 this AM and now 6/80/-2, cervix anterior, soft and stretchy  Plan for 2 hr check; IV PCN in case PTL, s/p BTM -  Start  Continuous external uterine contraction & fetal HR monitoring  Viable Baby girl born 2021 @1645, birthweight= 2920 g (6 lb 7 oz)  2021 10:36 PM Cervical Dilation: 6  Date:    Day 2:   slight cervical change --  Contractions every 5-8 minutes  FHT with occasional late decelerations, but reactive with moderate variability  Will discuss with ATTENDING  If decelerations continue, will likely move towards augmenting delivery with AROM/pitocin  ATTENDING ob: SVE: 7    ADMITTED for  labor,  Vertex confirmed by palpation  She was admitted last night with contractions and was noted to have advanced cervical dilation  Patient made slow change over night  TOCO: Contraction Frequency (minutes): 6-8  2021 2:01 PM AROM  Cervical Dilation: 7-8  2021 3:58 pm Cervical Effacement: 100,  Forebag appreciated, another AROM performed  Delivery Summary -  Procedure:   Anesthesia: none   Findings:  1   Viable female  delivered on 21 at 1645 weight pending;  Apgar scores of 8 at one minute and 9     ED Triage Vitals [21]   Temperature Pulse Respirations Blood Pressure SpO2   98 5 °F (36 9 °C) (!) 112  13469 --      Temp Source Heart Rate Source Patient Position - Orthostatic VS BP Location FiO2 (%)   Oral -- -- -- --      Pain Score       6          Wt Readings from Last 1 Encounters:   21 82 8 kg (182 lb 9 6 oz)     Additional Vital Signs:   Date/Time  Temp  Pulse  Resp  BP   21 0905  97 5 °F (36 4 °C)  84  --  106/57   21 0412  97 9 °F (36 6 °C)  99  18  100/51   21 0004  98 1 °F (36 7 °C)  86  18  101/60   21  98 5 °F (36 9 °C)  112Abnormal   18  134/69     Pertinent Labs/Diagnostic Test Results:       Results from last 7 days   Lab Units 21  1056 21  1048   WBC Thousand/uL 6 47 7 15   HEMOGLOBIN g/dL 11 2* 11 3*   HEMATOCRIT % 37 0 37 5   PLATELETS Thousands/uL 230 248   NEUTROS ABS Thousands/µL  --  4 86   BANDS PCT % 1  --        Results from last 7 days   Lab Units 21  1048   TSH 3RD GENERATON uIU/mL 1 964     Results from last 7 days   Lab Units 21  0732   GLUCOSE UA  negative   PROTEIN UA  negative       ED Treatment:   Medication Administration - No Administrations Displayed (No Start Event Found)     None        Past Medical History:   Diagnosis Date    Gallstone     Hypothyroid     Kidney stones      Present on Admission:   Maternal anemia in pregnancy, antepartum   Hypothyroid in pregnancy, antepartum      Admitting Diagnosis: Abdominal pain affecting pregnancy [O26 899, R10 9]  Age/Sex: 31 y o  female  Admission Orders:  Continuous external uterine contraction & fetal HR monitoring  Scheduled Medications:  bupivacaine (PF), , ,   levothyroxine, 125 mcg, Oral, Early Morning  Continuous IV Infusions:  lactated ringers, 125 mL/hr, Intravenous, Continuous    PRN Meds:  ondansetron, 4 mg, Intravenous, Q6H PRN    IP CONSULT TO ANESTHESIOLOGY  Network Utilization Review Department  ATTENTION: Please call with any questions or concerns to [phone] and carefully listen to the prompts so that you are directed to the right person  All voicemails are confidential   Sonam Grijalva all requests for admission clinical reviews, approved or denied determinations and any other requests to dedicated fax number below belonging to the campus where the patient is receiving treatment   List of dedicated fax numbers for the Facilities:  [address] DENIALS (Administrative/Medical Necessity) [phone]   [address] (Maternity/NICU/Pediatrics) [phone]   [address] [place] [address] Dr Frank Rollins [phone] [place] [address] [address] [facility] [place] [address] [phone]

## 2021-05-05 NOTE — OB LABOR/OXYTOCIN SAFETY PROGRESS
Labor Progress Note - Katherine Kim 31 y o  female MRN: [id number]    Unit/Bed#: L&D 325-01 Encounter: [id number]       Contraction Frequency (minutes): 2-6  Contraction Quality: Mild  Tachysystole: No   Cervical Dilation: 7-8        Cervical Effacement: 90  Fetal Station: -2  Baseline Rate: 160 bpm                     Vital Signs:   Vitals:    05/05/21 0905   BP: 106/57   Pulse: 84   Resp:    Temp: 97 5 °F (36 4 °C)           Notes/comments:   AROM minimal clear fluid  IV fluid bolus for maternal tachycardia  Continue current management          Lester Lees DO 5/5/2021 2:01 PM

## 2021-05-05 NOTE — PROGRESS NOTES
Fer Marroquin [age] y o  female MRN: [id number]  Unit/Bed#: L&D 325-01 Encounter: [id number]    Assessment: 31 y o   at 35w6d admitted for  labor  SVE: 7 /-2  FHT: 140 category I  Clinical EFW: 5 ; Vertex confirmed by palpation  GBS status: negative     Plan:   · Admit  · CBC, RPR, Type & Screen  · Analgesia at maternal request  · Expectant management   · Antibiotics not indicated        Chief Complaint: contractions    HPI: Cassie Chapa is a 31 y o  Nicholas Zuleima with an RAS of 6/3/2021, by Last Menstrual Period at 35w6d who is being admitted for  labor  She was admitted last night with contractions and was noted to have advanced cervical dilation  Patient made slow change over night  Patient did have a prior PTD at the same gestational age  She was on [facility] for this pregnancy and received a course of BTM -  She was on penicillin for GBS unknown status, however the culture has resulted negative and penicillin has been discontinued       Patient Active Problem List   Diagnosis    Supervision of other normal pregnancy, antepartum    Maternal anemia in pregnancy, antepartum    Hypothyroid in pregnancy, antepartum    Previous  delivery, antepartum    UTI in pregnancy    35 weeks gestation of pregnancy     labor in third trimester       Baby complications/comments: none    Review of Systems    OB History    Para Term  AB Living   2 1   1   1   SAB TAB Ectopic Multiple Live Births           1      # Outcome Date GA Lbr Ruiz/2nd Weight Sex Delivery Anes PTL Lv   2 Current            1  18 35w6d  2821 g (6 lb 3 5 oz) F Vag-Spont None Y CHANTELLE      Complications:  labor       Past Medical History:   Diagnosis Date    Gallstone     Hypothyroid     Kidney stones        Past Surgical History:   Procedure Laterality Date    CHOLECYSTECTOMY LAPAROSCOPIC  2018    During pregnancy       Social History     Tobacco Use  Smoking status: Never Smoker    Smokeless tobacco: Never Used   Substance Use Topics    Alcohol use: Not Currently     Frequency: Never       Allergies   Allergen Reactions    Doxycycline GI Intolerance       Medications Prior to Admission   Medication    levothyroxine 125 mcg tablet    Prenatal Vit-Fe Fumarate-FA (Prenatal Vitamin) 27-0 8 MG TABS       Objective:  Temp:  [97 5 °F (36 4 °C)-98 5 °F (36 9 °C)] 97 5 °F (36 4 °C)  HR:  [] 84  Resp:  [18] 18  BP: (100-134)/(51-69) 106/57  There is no height or weight on file to calculate BMI       Physical Exam:  OBGyn Exam     SVE:   7-8/90-2, soft, mid; stretchy    FHT:  Baseline Rate: 145 bpm  Variability: Moderate 6-25 bpm  Accelerations: 15 x 15 or greater  Decelerations: Periodic    TOCO:   Contraction Frequency (minutes): 6-8  Contraction Duration (seconds): 60-80  Contraction Quality: Mild    Lab Results   Component Value Date    WBC 7 15 05/03/2021    HGB 11 3 (L) 05/03/2021    HCT 37 5 05/03/2021     05/03/2021     No results found for: NA, K, CL, CO2, BUN, CREATININE, GLUCOSE, AST, ALT  Prenatal Labs: Reviewed      Blood type: O+  Antibody: negative  GBS: negative  HIV:NR  Rubella: Immune  VDRL/RPR: Non reactive  HBsAg: Negative  Chlamydia: Negative  Gonorrhea: Negative  Diabetes 1 hour screen: 131  3 hour glucose: n/a  Platelets: 028  Hgb: 11 3  >2 Midnights  INPATIENT     Signature/Title: Kailey Pat MD  Date: 5/5/2021  Time: 10:23 AM

## 2021-05-05 NOTE — OB LABOR/OXYTOCIN SAFETY PROGRESS
Oxytocin Safety Progress Check Note - Raji Armas 31 y o  female MRN: [id number]    Unit/Bed#: L&D 325-01 Encounter: [id number]                 Cervical Dilation: 6        Cervical Effacement: 80  Fetal Station: -2                        Vital Signs:   Vitals:    05/04/21 2000   BP: 134/69   Pulse: (!) 112   Resp: 18   Temp: 98 5 °F (36 9 °C)           Notes/comments:      Cervix slightly more effaced, more stretchy  Will continue PCN and continuous fetal monitoring  Will recheck as needed      Homero Russell MD 5/4/2021 10:36 PM

## 2021-05-06 LAB — RPR SER QL: NORMAL

## 2021-05-06 PROCEDURE — 99024 POSTOP FOLLOW-UP VISIT: CPT | Performed by: OBSTETRICS & GYNECOLOGY

## 2021-05-06 RX ADMIN — DOCUSATE SODIUM 100 MG: 100 CAPSULE, LIQUID FILLED ORAL at 17:43

## 2021-05-06 RX ADMIN — IBUPROFEN 600 MG: 600 TABLET ORAL at 08:22

## 2021-05-06 RX ADMIN — IBUPROFEN 600 MG: 600 TABLET ORAL at 15:37

## 2021-05-06 RX ADMIN — DOCUSATE SODIUM 100 MG: 100 CAPSULE, LIQUID FILLED ORAL at 08:22

## 2021-05-06 RX ADMIN — IBUPROFEN 600 MG: 600 TABLET ORAL at 01:07

## 2021-05-06 RX ADMIN — LEVOTHYROXINE SODIUM 125 MCG: 25 TABLET ORAL at 06:24

## 2021-05-06 RX ADMIN — ACETAMINOPHEN 650 MG: 325 TABLET ORAL at 06:02

## 2021-05-06 NOTE — LACTATION NOTE
This note was copied from a baby's chart  Baby finished feeding and supplementing with hand expressed colostrum at 0900  She breast fed for 15 minutes and then mom hand expressed an additional 1 ml of colostrum, which we syringe fed to baby  I then placed baby skin to skin with mom and enc mom to keep her there until next blood sugar  Nursing staff made aware that post feed blood sugar is due at 1000

## 2021-05-06 NOTE — PLAN OF CARE
Problem: POSTPARTUM  Goal: Experiences normal postpartum course  Description: INTERVENTIONS:  - Monitor maternal vital signs  - Assess uterine involution and lochia  Outcome: Progressing  Goal: Appropriate maternal -  bonding  Description: INTERVENTIONS:  - Identify family support  - Assess for appropriate maternal/infant bonding   -Encourage maternal/infant bonding opportunities  - Referral to  or  as needed  Outcome: Progressing  Goal: Establishment of infant feeding pattern  Description: INTERVENTIONS:  - Assess breast/bottle feeding  - Refer to lactation as needed  Outcome: Progressing  Goal: Incision(s), wounds(s) or drain site(s) healing without S/S of infection  Description: INTERVENTIONS  - Assess and document risk factors for skin impairment   - Assess and document dressing, incision, wound bed, drain sites and surrounding tissue  - Consider nutrition services referral as needed  - Oral mucous membranes remain intact  - Provide patient/ family education  Outcome: Progressing     Problem: PAIN - ADULT  Goal: Verbalizes/displays adequate comfort level or baseline comfort level  Description: Interventions:  - Encourage patient to monitor pain and request assistance  - Assess pain using appropriate pain scale  - Administer analgesics based on type and severity of pain and evaluate response  - Implement non-pharmacological measures as appropriate and evaluate response  - Consider cultural and social influences on pain and pain management  - Notify physician/advanced practitioner if interventions unsuccessful or patient reports new pain  Outcome: Progressing     Problem: INFECTION - ADULT  Goal: Absence or prevention of progression during hospitalization  Description: INTERVENTIONS:  - Assess and monitor for signs and symptoms of infection  - Monitor lab/diagnostic results  - Monitor all insertion sites, i e  indwelling lines, tubes, and drains  - Monitor endotracheal if appropriate and nasal secretions for changes in amount and color  - [place] appropriate cooling/warming therapies per order  - Administer medications as ordered  - Instruct and encourage patient and family to use good hand hygiene technique  - Identify and instruct in appropriate isolation precautions for identified infection/condition  Outcome: Progressing     Problem: SAFETY ADULT  Goal: Patient will remain free of falls  Description: INTERVENTIONS:  - Assess patient frequently for physical needs  -  Identify cognitive and physical deficits and behaviors that affect risk of falls    -  [place] fall precautions as indicated by assessment   - Educate patient/family on patient safety including physical limitations  - Instruct patient to call for assistance with activity based on assessment  - Modify environment to reduce risk of injury  - Consider OT/PT consult to assist with strengthening/mobility  Outcome: Progressing  Goal: Maintain or return to baseline ADL function  Description: INTERVENTIONS:  -  Assess patient's ability to carry out ADLs; assess patient's baseline for ADL function and identify physical deficits which impact ability to perform ADLs (bathing, care of mouth/teeth, toileting, grooming, dressing, etc )  - Assess/evaluate cause of self-care deficits   - Assess range of motion  - Assess patient's mobility; develop plan if impaired  - Assess patient's need for assistive devices and provide as appropriate  - Encourage maximum independence but intervene and supervise when necessary  - Involve family in performance of ADLs  - Assess for home care needs following discharge   - Consider OT consult to assist with ADL evaluation and planning for discharge  - Provide patient education as appropriate  Outcome: Progressing  Goal: Maintain or return mobility status to optimal level  Description: INTERVENTIONS:  - Assess patient's baseline mobility status (ambulation, transfers, stairs, etc )    - Identify cognitive and physical deficits and behaviors that affect mobility  - Identify mobility aids required to assist with transfers and/or ambulation (gait belt, sit-to-stand, lift, walker, cane, etc )  - [place] fall precautions as indicated by assessment  - Record patient progress and toleration of activity level on Mobility SBAR; progress patient to next Phase/Stage  - Instruct patient to call for assistance with activity based on assessment  - Consider rehabilitation consult to assist with strengthening/weightbearing, etc   Outcome: Progressing     Problem: Knowledge Deficit  Goal: Patient/family/caregiver demonstrates understanding of disease process, treatment plan, medications, and discharge instructions  Description: Complete learning assessment and assess knowledge base    Interventions:  - Provide teaching at level of understanding  - Provide teaching via preferred learning methods  Outcome: Progressing     Problem: DISCHARGE PLANNING  Goal: Discharge to home or other facility with appropriate resources  Description: INTERVENTIONS:  - Identify barriers to discharge w/patient and caregiver  - Arrange for needed discharge resources and transportation as appropriate  - Identify discharge learning needs (meds, wound care, etc )  - Arrange for interpretive services to assist at discharge as needed  - Refer to Case Management Department for coordinating discharge planning if the patient needs post-hospital services based on physician/advanced practitioner order or complex needs related to functional status, cognitive ability, or social support system  Outcome: Progressing

## 2021-05-06 NOTE — LACTATION NOTE
This note was copied from a baby's chart  Met with mother  Provided mother with Ready, Set, Baby booklet  Discussed Skin to Skin contact an benefits to mom and baby  Talked about the delay of the first bath until baby has adjusted  Spoke about the benefits of rooming in  Feeding on cue and what that means for recognizing infant's hunger  Avoidance of pacifiers for the first month discussed  Talked about exclusive breastfeeding for the first 6 months  Positioning and latch reviewed as well as showing images of other feeding positions  Discussed the properties of a good latch in any position  Reviewed hand/manual expression  Discussed s/s that baby is getting enough milk and some s/s that breastfeeding dyad may need further help  Gave information on common concerns, what to expect the first few weeks after delivery, preparing for other caregivers, and how partners can help  Resources for support also provided  Assisted mom with breastfeeding  Baby sleepy  I demo  ways to awaken her  Baby woke up and latch fairly well with enc  Will have mom hand express and syringe feed colostrum until better established  If baby is not latching better at 24 hours, I will have mom start pumping also  Mom is interested in donor milk if a supplement becomes necessary  Mom is experienced with BF a LPI  Her first daughter was also LPI and did not latch for 1-2 months, mom excl  pumped and bottle fed until baby learned to latch and then she breast fed for 12 months  Enc mom to call for asst as needed,phone # provided

## 2021-05-06 NOTE — LACTATION NOTE
This note was copied from a baby's chart  Assisted mom with breastfeeding  Baby awake at times and sleepy at times  She took the 15 ml of donor milk at the breast fairly well  Needed a lot of enc to stay awake  I enc mom to pump after feeds to stimulate her milk production

## 2021-05-06 NOTE — LACTATION NOTE
This note was copied from a baby's chart  Went in to check on mom and see how baby fed last feeding  Donor milk was still at the bedside  Mom verb she could not get her to wake and eat, but was able to give her 2 4ml of her pumped milk  I then assisted mom with waking and latching baby  We were able to get her to take the 15 ml of donor milk at the breast with a syringe and feeding tube, but she was very sleepy and difficult to latch because of it

## 2021-05-06 NOTE — LACTATION NOTE
This note was copied from a baby's chart  Assisted mom with breastfeeding and supplementing for low blood sugar  Mom chose to use donor milk via SNS  I demo  to her how to set up the syringe and feeding tube and assisted with latching baby   Baby very sleepy this feeding, but we were able to get her to take the 15 ml of donor milk at the breast

## 2021-05-06 NOTE — PROGRESS NOTES
Progress Note - OB/GYN   Curly Salazar 31 y o  female MRN: [id number]  Unit/Bed#: L&D 308-59 Encounter: [id number]    Curly Salazar is a patient of Beulah    Subjective/Objective     Chief Complaint: Postpartum State      Subjective:   Patient is PPD# 1   After delivery, patient had clots evacuated from the [facility]  Her bleeding has been stable since  Baby in the room  She is recovering well and is stable  Pain: no  Tolerating Oral Intake: yes  Voiding: yes  Flatus: yes  Bowel Movement: no  Ambulating: yes  Breastfeeding: Breastfeeding  Chest Pain: no  Shortness of Breath: no  Leg Pain/Discomfort: no  Lochia: moderate    Objective:   Vitals:   /61 (BP Location: Left arm)   Pulse 76   Temp (!) 96 2 °F (35 7 °C) (Temporal)   Resp 18   Ht 5' 4" (1 626 m)   Wt 82 8 kg (182 lb 9 6 oz)   LMP 08/27/2020   Breastfeeding Yes   BMI 31 34 kg/m²   Body mass index is 31 34 kg/m²    I/O       05/04 0701 - 05/05 0700 05/05 0701 - 05/06 0700    Urine (mL/kg/hr)  1100 (0 6)    Blood  145    Total Output  1245    Net  -1245              Lab Results   Component Value Date    WBC 6 47 05/05/2021    HGB 11 2 (L) 05/05/2021    HCT 37 0 05/05/2021    MCV 82 05/05/2021     05/05/2021       Meds/Allergies   Current Facility-Administered Medications   Medication Dose Route Frequency    acetaminophen (TYLENOL) tablet 650 mg  650 mg Oral Q4H PRN    benzocaine-menthol-lanolin-aloe (DERMOPLAST) 20-0 5 % topical spray   Topical 4x Daily PRN    calcium carbonate (TUMS) chewable tablet 1,000 mg  1,000 mg Oral Daily PRN    diphenhydrAMINE (BENADRYL) tablet 25 mg  25 mg Oral Q6H PRN    docusate sodium (COLACE) capsule 100 mg  100 mg Oral BID    hydrocortisone 1 % cream 1 application  1 application Topical 4x Daily PRN    ibuprofen (MOTRIN) tablet 600 mg  600 mg Oral Q6H PRN    lactated ringers infusion  125 mL/hr Intravenous Continuous    levothyroxine tablet 125 mcg  125 mcg Oral Early Morning    ondansetron (ZOFRAN) injection 4 mg  4 mg Intravenous Q8H PRN    oxytocin (PITOCIN) 30 Units in lactated ringers 500 mL infusion  250 rei-units/min Intravenous Continuous    witch hazel-glycerin (TUCKS) topical pad 1 pad  1 pad Topical Q2H PRN       Physical Exam:  General: in no apparent distress  Cardiovascular: Cor RRR  Lungs: clear to auscultation bilaterally  Abdomen: abdomen is soft without significant tenderness, masses, organomegaly or guarding  Fundus: Firm, 1 cm below the umbilicus  Lower extremeties: nontender    Assessment:  Post partum day #1 s/p  at 35 weeks , stable, and doing well    Plan:    PPD# 1  - Continue routine post partum care   - Encourage ambulation   - Encourage breastfeeding  - Continue current meds     Disposition    - Anticipate discharge home 287 Karishma Villalobos MD  OB/GYN PGY-1  2021   6:01 AM

## 2021-05-06 NOTE — LACTATION NOTE
This note was copied from a baby's chart  Demo use and cleaning of breast pump  Mom wants to try to pump, to see if she can get her own breast milk to supplement baby

## 2021-05-07 VITALS
HEART RATE: 70 BPM | WEIGHT: 182.6 LBS | OXYGEN SATURATION: 96 % | DIASTOLIC BLOOD PRESSURE: 71 MMHG | RESPIRATION RATE: 18 BRPM | HEIGHT: 64 IN | SYSTOLIC BLOOD PRESSURE: 118 MMHG | BODY MASS INDEX: 31.18 KG/M2 | TEMPERATURE: 98.1 F

## 2021-05-07 PROBLEM — O99.280 HYPOTHYROID IN PREGNANCY, ANTEPARTUM: Status: RESOLVED | Noted: 2021-03-16 | Resolved: 2021-05-07

## 2021-05-07 PROBLEM — O09.219 PREVIOUS PRETERM DELIVERY, ANTEPARTUM: Status: RESOLVED | Noted: 2021-03-16 | Resolved: 2021-05-07

## 2021-05-07 PROBLEM — O23.40 UTI IN PREGNANCY: Status: RESOLVED | Noted: 2021-03-16 | Resolved: 2021-05-07

## 2021-05-07 PROBLEM — E03.9 HYPOTHYROID IN PREGNANCY, ANTEPARTUM: Status: RESOLVED | Noted: 2021-03-16 | Resolved: 2021-05-07

## 2021-05-07 PROBLEM — Z34.80 SUPERVISION OF OTHER NORMAL PREGNANCY, ANTEPARTUM: Status: RESOLVED | Noted: 2021-03-16 | Resolved: 2021-05-07

## 2021-05-07 PROCEDURE — 99024 POSTOP FOLLOW-UP VISIT: CPT | Performed by: OBSTETRICS & GYNECOLOGY

## 2021-05-07 RX ORDER — ACETAMINOPHEN 325 MG/1
650 TABLET ORAL EVERY 4 HOURS PRN
Qty: 30 TABLET | Refills: 0
Start: 2021-05-07 | End: 2021-06-03 | Stop reason: ALTCHOICE

## 2021-05-07 RX ORDER — IBUPROFEN 200 MG
600 TABLET ORAL EVERY 6 HOURS PRN
Start: 2021-05-07 | End: 2021-06-10

## 2021-05-07 RX ORDER — DOCUSATE SODIUM 100 MG/1
100 CAPSULE, LIQUID FILLED ORAL 2 TIMES DAILY
Qty: 10 CAPSULE | Refills: 0 | Status: CANCELLED | OUTPATIENT
Start: 2021-05-07

## 2021-05-07 RX ORDER — IBUPROFEN 600 MG/1
600 TABLET ORAL EVERY 6 HOURS PRN
Qty: 30 TABLET | Refills: 0 | Status: CANCELLED | OUTPATIENT
Start: 2021-05-07

## 2021-05-07 RX ADMIN — DOCUSATE SODIUM 100 MG: 100 CAPSULE, LIQUID FILLED ORAL at 08:07

## 2021-05-07 RX ADMIN — IBUPROFEN 600 MG: 600 TABLET ORAL at 08:07

## 2021-05-07 RX ADMIN — IBUPROFEN 600 MG: 600 TABLET ORAL at 02:01

## 2021-05-07 RX ADMIN — LEVOTHYROXINE SODIUM 125 MCG: 25 TABLET ORAL at 05:56

## 2021-05-07 NOTE — DISCHARGE INSTRUCTIONS
Vaginal Delivery   WHAT YOU SHOULD KNOW:   A vaginal delivery is the birth of your baby through your vagina (birth canal)  AFTER YOU LEAVE:   Medicines:  · NSAIDs  help decrease swelling and pain or fever  This medicine is available with or without a doctor's order  NSAIDs can cause stomach bleeding or kidney problems in certain people  If you take blood thinner medicine, always ask your healthcare provider if NSAIDs are safe for you  Always read the medicine label and follow directions  · Take your medicine as directed  Call your healthcare provider if you think your medicine is not helping or if you have side effects  Tell him if you are allergic to any medicine  Keep a list of the medicines, vitamins, and herbs you take  Include the amounts, and when and why you take them  Bring the list or the pill bottles to follow-up visits  Carry your medicine list with you in case of an emergency  Follow up with your primary healthcare provider:  Most women need to return 6 weeks after a vaginal delivery  Ask about how to care for your wounds or stitches  Write down your questions so you remember to ask them during your visits  Activity:  Rest as much as possible  Try to keep all activities short  You may be able to do some exercise soon after you have your baby  Talk with your primary healthcare provider before you start exercising  If you work outside the home, ask when you can return to your job  Kegel exercises:  Kegel exercises may help your vaginal and rectal muscles heal faster  You can do Kegel exercises by tightening and relaxing the muscles around your vagina  Kegel exercises help make the muscles stronger  Breast care:  When your milk comes in, your breasts may feel full and hard  Ask how to care for your breasts, even if you are not breastfeeding  Constipation:  Do not try to push the bowel movement out if it is too hard   High-fiber foods, extra liquids, and regular exercise can help you prevent constipation  Examples of high-fiber foods are fruit and bran  Prune juice and water are good liquids to drink  Regular exercise helps your digestive system work  You may also be told to take over-the-counter fiber and stool softener medicines  Take these items as directed  Hemorrhoids:  Pregnancy can cause severe hemorrhoids  You may have rectal pain because of the hemorrhoids  Ask how to prevent or treat hemorrhoids  Perineum care: Your perineum is the area between your vagina and anus  Keep the area clean and dry to help it heal and to prevent infection  Wash the area gently with soap and water when you bathe or shower  Rinse your perineum with warm water when you use the toilet  Your primary healthcare provider may suggest you use a warm sitz bath to help decrease pain  A sitz bath is a bathtub or basin filled to hip level  Stay in the sitz bath for 20 to 30 minutes, or as directed  Vaginal discharge: You will have vaginal discharge, called lochia, after your delivery  The lochia is bright red the first day or two after the birth  By the fourth day, the amount decreases, and it turns red-brown  Use a sanitary pad rather than a tampon to prevent a vaginal infection  It is normal to have lochia up to 8 weeks after your baby is born  Monthly periods: Your period may start again within 7 to 12 weeks after your baby is born  If you are breastfeeding, it may take longer for your period to start again  You can still get pregnant again even though you do not have your monthly period  Talk with your primary healthcare provider about a birth control method that will be good for you if you do not want to get pregnant  Mood changes: Many new mothers have some kind of mood changes after delivery  Some of these changes occur because of lack of sleep, hormone changes, and caring for a new baby  Some mood changes can be more serious, such as postpartum depression   Talk with your primary healthcare provider if you feel unable to care for yourself or your baby  Sexual activity:  You may need to avoid sex for 6 to 7 weeks after you have your baby  You may notice you have a decreased desire for sex, or sex may be painful  You may need to use a vaginal lubricant (gel) to help make sex more comfortable  Contact your primary healthcare provider if:   · You have heavy vaginal bleeding that fills 1 or more sanitary pads in 1 hour  · You have a fever  · Your pain does not go away, or gets worse  · The skin between your vagina and rectum is swollen, warm, or red  · You have swollen, hard, or painful breasts  · You feel very sad or depressed  · You feel more tired than usual      · You have questions or concerns about your condition or care  Seek care immediately or call 911 if:   · You have pus or yellow drainage coming from your vagina or wound  · You are urinating very little, or not at all  · Your arm or leg feels warm, tender, and painful  It may look swollen and red  · You feel lightheaded, have sudden and worsening chest pain, or trouble breathing  You may have more pain when you take deep breaths or cough, or you may cough up blood  © 2014 [address] is for End User's use only and may not be sold, redistributed or otherwise used for commercial purposes  All illustrations and images included in CareNotes® are the copyrighted property of [employer]  or Andrzej Kwan  The above information is an  only  It is not intended as medical advice for individual conditions or treatments  Talk to your doctor, nurse or pharmacist before following any medical regimen to see if it is safe and effective for you  COVID-19 (Coronavirus Disease 2019)   WHAT YOU NEED TO KNOW:   What do I need to know about coronavirus disease 2019 (COVID-19)? COVID-19 is the disease caused by the novel (new) coronavirus first discovered in December 2019   Coronaviruses generally cause upper respiratory (nose, throat, and lung) infections, such as a cold  The new virus can also cause serious lower respiratory conditions, such as pneumonia or acute respiratory distress syndrome (ARDS)  Anyone can develop serious problems from the new virus, but your risk is higher if you are 65 or older  A weak immune system, diabetes, or a heart or lung condition can also increase your risk  What are the signs and symptoms of COVID-19? You may not develop any signs or symptoms  Signs and symptoms that do develop usually start about 5 days after infection but can take 2 to 14 days  Signs and symptoms range from mild to severe  You may feel like you have the flu or a bad cold  Information on COVID-19 is still being learned  Tell your healthcare provider if you think you were infected but develop signs or symptoms not listed below:  · A cough    · Shortness of breath or trouble breathing that may become severe    · A fever of at least 100 4°F, or 38°C (may be lower in adults 65 or older)    · Chills that might include shaking    · Muscle pain, body aches, or a headache    · A sore throat    · Suddenly not being able to taste or smell anything    · Feeling mentally and physically tired (fatigue)    · Congestion (stuffy head and nose), or a runny nose    · Diarrhea, nausea, or vomiting    How is COVID-19 diagnosed? If you think you have COVID-19, call your healthcare provider  In some areas, testing is only done if a person has severe symptoms or is hospitalized  Testing is done more widely in other places  Your provider will tell you what to do based on your symptoms and the rules in your area  In general, the following may be used:  · A viral test  shows if you have a current infection  Samples are taken from your nose and throat, usually with swabs  You may need to wait several days to get the test results  Your healthcare provider will tell you how to get your results   You will need to quarantine (stay physically away from others) until you get your results  If results show you have COVID-19, you will need to quarantine until you are well  Your provider or other health official may give you more directions  You will also need to prevent another infection until it is known if you can get COVID-19 again  · An antibody test  shows if you had a past infection  Blood samples are used for this test  Antibodies are made by your immune system to attack the virus that causes COVID-19  Antibodies will form 1 to 3 weeks after you are infected  It is not known if antibodies prevent a second infection, or for how long a person might be protected  If you have antibodies, you will still need to be careful around others until more is known  · CT scans or x-rays  may be used to check for signs of pneumonia  The 2019 coronavirus causes a specific kind of pneumonia, usually in both lungs  How is COVID-19 treated? No medicine or specific treatment is currently approved for COVID-19  The following may be used to manage your symptoms or treat the effects of COVID-19:  · Mild symptoms  may get better on their own  If you do not need to be treated in a hospital, you will be given instructions to use at home  Your condition will be closely monitored  You will need to watch for worsening symptoms and seek immediate care if needed  Talk to your healthcare provider about the following:    ? Relieve your symptoms  To soothe a sore throat, gargle with warm salt water, or use throat lozenges or a throat spray  Your healthcare provider may recommend a cough medicine  Drink more liquids to thin and loosen mucus and to prevent dehydration  Use decongestants or saline drops as directed for nasal congestion  ? NSAIDs or acetaminophen  can help lower a fever and relieve body aches or a headache  Follow directions  If not taken correctly, NSAIDs can cause kidney damage and acetaminophen can cause liver damage      · Severe or life-threatening symptoms  are treated in the hospital  You may need a combination of the following:    ? Medicines  may be given to reduce inflammation or to fight the virus  You may also need blood thinners to prevent or treat blood clots  If you have a deep vein thrombosis (DVT) or pulmonary embolism (PE), you may need to keep using blood thinners for 3 months  ? Extra oxygen  may be given if you have respiratory failure  This means your lungs cannot get enough oxygen into your blood and out to your organs  Extra oxygen can help prevent organ failure  ? A ventilator  may be used to help you breathe  ? Convalescent plasma (part of blood)  from a patient who has recovered from COVID-19 may be used  The plasma contains antibodies that can help your body fight the infection  Convalescent plasma is only given to patients who have severe signs and symptoms  How does the 2019 coronavirus spread? The virus spreads quickly and easily  You can become infected if you are in contact with a large amount of the virus, even for a short time  You can also become infected by being around a small amount of virus for a long time  The following are ways the virus is thought to spread, but more information may be coming:  · Droplets are the most common way all coronaviruses spread  The virus can travel in droplets that form when a person talks, coughs, or sneezes  Anyone who breathes in the droplets or gets them in his or her eyes can become infected with the virus  Close personal contact with an infected person is thought to be the main way the virus spreads  Close personal contact means you are within 6 feet (2 meters) of the person  · Person-to-person contact can spread the virus  For example, a person with the virus on his or her hands can spread it by shaking hands with someone  At this time, it does not appear that the virus can be passed to a baby during pregnancy or delivery   The baby can be infected after he or she is born through person-to-person contact  The virus also does not appear to spread in breast milk  If you are pregnant or breastfeeding, talk to your healthcare provider or obstetrician about any concerns you have  · The virus can stay on objects and surfaces  A person can get the virus on his or her hands by touching the object or surface  Infection happens if the person then touches his or her eyes or mouth with unwashed hands  It is not yet known how long the virus can stay on an object or surface  That is why it is important to clean all surfaces that are used regularly  · An infected animal may be able to infect a person who touches it  This may happen at live markets or on a farm  How can everyone lower the risk for COVID-19? The best way to prevent infection is to avoid anyone who is infected, but this can be hard to do  An infected person can spread the virus before signs or symptoms begin, or even if signs or symptoms never develop  The following can help lower the risk for infection:      · Wash your hands often throughout the day  Use soap and water  Rub your soapy hands together, lacing your fingers  Wash the front and back of each hand, and in between your fingers  Use the fingers of one hand to scrub under the fingernails of the other hand  Wash for at least 20 seconds  Rinse with warm, running water for several seconds  Then dry your hands with a clean towel or paper towel  Use hand  that contains alcohol if soap and water are not available  Do not touch your eyes, nose, or mouth without washing your hands first  Teach children how to wash their hands and use hand   · Cover a sneeze or cough  This prevents droplets from traveling from you to others  Turn your face away and cover your mouth and nose with a tissue  Throw the tissue away  Use the bend of your arm if a tissue is not available   Then wash your hands well with soap and water or use hand   Turn and cover your face if you are around someone who is sneezing or coughing  Teach children how to cover a cough or sneeze  · Follow worldwide, national, and local social distancing guidelines  Social distancing means people avoid close physical contact so the virus cannot spread from one person to another  Keep at least 6 feet (2 meters) between you and others  Also keep this distance from anyone who comes to your home, such as someone making a delivery  · Make a habit of not touching your face  It is not known how long the virus can stay on objects and surfaces  If you get the virus on your hands, you can transfer it to your eyes, nose, or mouth and become infected  You can also transfer it to objects, surfaces, or people  Be aware of what you touch when you go out  Examples include handrails and elevator buttons  Try not to touch anything with bare hands unless it is necessary  Wash your hands before you leave your home and when you return  · Clean and disinfect high-touch surfaces and objects often  Use a disinfecting solution or wipes  You can make a solution by diluting 4 teaspoons of bleach in 1 quart (4 cups) of water  Clean and disinfect even if you think no one living in or coming to your home is infected with the virus  You can wipe items with a disinfecting cloth before you bring them into your home  Wash your hands after you handle anything you bring into your home  · Make your immune system as healthy as possible  A weakened immune system makes you more vulnerable to the new coronavirus  No COVID-19 vaccine is available yet  Vaccines such as the flu and pneumonia vaccines can help your immune system  Your healthcare provider can tell you which vaccines to get, and when to get them  Keep your immune system as strong as possible  Do not smoke  Eat healthy foods, exercise regularly, and try to manage stress  Go to bed and wake up at the same times each day       How do I follow social distancing guidelines to help lower the risk for COVID-19? National and local social distancing rules vary  Rules may change over time as restrictions are lifted  Restrictions may return if an outbreak happens where you live  It is important to know and follow all current social distancing rules in your area  The following are general guidelines:  · Limit trips out of your home  You may be able to have food, medicines, and other supplies delivered  If possible, have delivered items left at your door or other area  Try not to have someone hand you an item  You will be so close to the person that the virus can spread between you  · Do not have close physical contact with anyone who does not live in your home  Do not shake hands with, hug, or kiss a person as a greeting  Stand or walk as far from others as possible  If you must use public transportation (such as a bus or subway), try to sit or stand away from others  You can stay safely connected with others through phone calls, e-mail messages, social media websites, and video chats  Check in on anyone who may be having a hard time socially distancing, or who lives alone  Ask administrators at nursing homes or long-term care facilities how you can safely communicate with someone living there  · Wear a cloth face covering around others who do not live in your home  Face coverings help prevent the virus from spreading to others in droplets  You can use a clear face covering if someone needs to read your lips  This is a cloth covering that has plastic over the mouth area so your lips can be seen  Do not use coverings that have breathing valves or vents  The virus can travel out of the valve or vent and be spread to others  Do not take your covering off to talk, cough, or sneeze  Do not use coverings on children younger than 2 years or on anyone who has breathing problems or cannot remove it      · Only allow medical or other necessary professionals into your home  Wear your face covering, and remind professionals to wear a face covering  Remind them to wash their hands when they arrive and before they leave  Do not  let anyone who does not live in your home in, even if the person is not sick  A person can pass the virus to others before symptoms of COVID-19 begin  Some people never even develop symptoms  Children commonly have mild symptoms or no symptoms  It may be hard to tell a child not to hug or kiss you  Explain that this is how he or she can help you stay healthy  · Do not go to someone else's home unless it is necessary  Do not go over to visit, even if the person is lonely  Only go if you need to help him or her  Make sure you both wear face coverings while you are there  · Avoid large gatherings and crowds  Gatherings or crowds of 10 or more individuals can cause the virus to spread  Examples of gatherings include parties, sporting events, Confucianism services, and conferences  Crowds may form at beaches, lai, and tourist attractions  Protect yourself by staying away from large gatherings and crowds  · Ask your healthcare provider for other ways to have appointments  You may be able to have appointments without having to go into the provider's office  Some providers offer phone, video, or other types of appointments  You may also be able to get prescriptions for a few months of your medicines at a time  · Stay safe if you must go out to work  You may have a job that can only be done outside your home  Keep physical distance between you and other workers as much as possible  Follow your employer's rules so everyone stays safe  What should I do if I have COVID-19 and am recovering at home? Healthcare providers will give you specific instructions to follow  The following are general guidelines to remind you how to keep others safe until you are well:  · Wash your hands often  Use soap and water as much as possible   You can use hand  that contains alcohol if soap and water are not available  Do not share towels with anyone  If you use paper towels, throw them away in a lined trash can kept in your room or area  Use a covered trash can, if possible  · Do not go out of your home unless it is necessary  You may have to go to your healthcare provider's office for check-ups or to get prescription refills  Do not arrive at the provider's office without an appointment  Providers have to make their offices safe for staff and other patients  · Do not have close physical contact with anyone unless it is necessary  Only have close physical contact with a person giving direct care, or a baby or child you must care for  Family members and friends should not visit you  If possible, stay in a separate area or room of your home if you live with others  No one should go into the area or room except to give you care  You can visit with others by phone, video chat, e-mail, or similar systems  It is important to stay connected with others in your life while you recover  · Wear a face covering while others are near you  This can help prevent droplets from spreading the virus when you talk, sneeze, or cough  Put the covering on before anyone comes into your room or area  Remind the person to cover his or her nose and mouth before going in to provide care for you  · Do not share items  Do not share dishes, towels, or other items with anyone  Items need to be washed after you use them  · Protect your baby  Wash your hands with soap and water often throughout the day  Wear a clean face covering while you breastfeed, or while you express or pump breast milk  If possible, ask someone who is well to care for your baby  You can put breast milk in bottles for the person to use, if needed  Talk to your healthcare provider if you have any questions or concerns about caring for or bonding with your baby   He or she will tell you when to bring your baby in for check-ups and vaccines  He or she will also tell you what to do if you think your baby was infected with the new virus  · Do not handle live animals  Until more is known, it is best not to touch, play with, or handle live animals  Some animals, including pets, have been infected with the new coronavirus  Do not handle or care for animals until you are well  Care includes feeding, petting, and cuddling your pet  Do not let your pet lick you or share your food  Ask someone who is not infected to take care of your pet, if possible  If you must care for a pet, wear a face covering  Wash your hands before and after you give care  · Follow directions from your healthcare provider for being around others after you recover  You will need to wait at least 10 days after symptoms first appeared  Then you will need to have no fever for 24 hours without fever medicine, and no other symptoms  A loss of taste or smell may continue for several months  It is considered okay to be around others if this is your only symptom  It is not known for sure if or for how long a recovered person can pass the virus to others  Your provider may give you instructions, such as continuing social distancing or wearing a face covering around others  How should I take care of someone who has COVID-19? If the person lives in another home, arrange for a time to give care  Remember to bring a few pairs of disposable gloves and a cloth face covering  The following are general guidelines to help you safely care for anyone who has COVID-19:  · Wash your hands often  Wash before and after you go into the person's home, area, or room  Throw paper towels away in a lined trash can that has a lid, if possible  · Do not allow others to go near the person  No one should come into the person's home unless it is necessary  If possible, the person should be in a separate area or room if he or she lives with others   Keep the room's door shut unless you need to go in or out  Have others call, video chat, or e-mail the person if he or she is feeling well enough  The person may feel lonely if he or she is kept separate for a long period of time  Safe communication can help him or her stay connected to family and friends  · Make sure the person's room has good air flow  You may be able to open the window if the weather allows  An air conditioner can also be turned on to help air move  · Contact the person before you go in to give care  Make sure the person is wearing a face covering  Remind him or her to wash his or her hands with soap and water  He or she can use hand  that contains alcohol if soap and water are not available  Put on a face covering before you go in to give care  · Wear gloves while you give care and clean  Clean items the person uses often  Clean countertops, cooking surfaces, and the fronts and insides of the microwave and refrigerator  Clean the shower, toilet, the area around the toilet, the sink, the area around the sink, and faucets  Gather used laundry or bedding  Wash and dry items on the warmest settings the fabric allows  Wash dishes and silverware in hot, soapy water or in a   · Anything you throw away needs to go into a lined trash can  When you need to empty the trash, close the open end of the lining and tie it closed  This helps prevent items the virus is on from spilling out of the trash  Remove your gloves and throw them away  Wash your hands  Where can I find more information? · Centers for Disease Control and Prevention  [address] , [address]  Phone: [phone]  Web Address: [employer] com     What should I do if I think I or someone I know may be infected? Do the following to protect others:  · If emergency care is needed,  tell the  about the possible infection, or call ahead and tell the emergency department      · Call a healthcare provider  for instructions if symptoms are mild  Anyone who may be infected should not  arrive without calling first  The provider will need to protect staff members and other patients  · The person who may be infected needs to wear a face covering  while getting medical care  This will help lower the risk of infecting others  Coverings are not used for anyone who is younger than 2 years, has breathing problems, or cannot remove it  The provider can give you instructions for anyone who cannot wear a covering  Call your local emergency number (911 in the [address] [place] [address],[address]) or an emergency department if:   · You have trouble breathing or shortness of breath at rest     · You have chest pain or pressure that lasts longer than 5 minutes  · You become confused or hard to wake  · Your lips or face are blue  · You have a fever of 104°F (40°C) or higher  When should I call my doctor? · You do not  have symptoms of COVID-19 but had close physical contact within 14 days with someone who tested positive  · You have questions or concerns about your condition or care  CARE AGREEMENT:   You have the right to help plan your care  Learn about your health condition and how it may be treated  Discuss treatment options with your healthcare providers to decide what care you want to receive  You always have the right to refuse treatment  The above information is an  only  It is not intended as medical advice for individual conditions or treatments  Talk to your doctor, nurse or pharmacist before following any medical regimen to see if it is safe and effective for you  © Copyright [address] Information is for End User's use only and may not be sold, redistributed or otherwise used for commercial purposes   All illustrations and images included in CareNotes® are the copyrighted property of A D A "[employer]" , Inc  or [address] [place] [address] [facility]

## 2021-05-07 NOTE — PROGRESS NOTES
Progress Note - OB/GYN   Sathish Lucita 31 y o  female MRN: [id number]  Unit/Bed#: L&D 314-01 Encounter: [id number]    Assessment:  Post partum Day #2 s/p , stable, baby in room    Plan:  Continue routine post partum care   Encourage ambulation   Encourage breastfeeding   Anticipate discharge today       Subjective/Objective   Chief Complaint:   Post delivery     Subjective:   Pain: yes, cramping, improved with PO meds  Tolerating PO: yes  Voiding: yes  Flatus: yes  Ambulating: yes  Chest pain: no  Shortness of breath: no  Leg pain: no  Lochia: within normal limits  Infant feeding: breast    Objective:     Vitals: /72 (BP Location: Left arm)   Pulse 75   Temp (!) 97 4 °F (36 3 °C) (Temporal)   Resp 18   Ht 5' 4" (1 626 m)   Wt 82 8 kg (182 lb 9 6 oz)   LMP 2020   SpO2 96%   Breastfeeding Yes   BMI 31 34 kg/m²     No intake or output data in the 24 hours ending 21 0655    Lab Results   Component Value Date    WBC 6 47 2021    HGB 11 2 (L) 2021    HCT 37 0 2021    MCV 82 2021     2021       Physical Exam:     General: alert and oriented x3, in no apparent distress  Cardiovascular: regular rate and rhythm  Lungs: clear to auscultation bilaterally  Abdomen: Soft, non-tender, non-distended, no rebound or guarding   Uterine fundus firm and non-tender, -1 cm below the umbilicus   Extremities: Non tender     Juan Ramon Moore MD  2021  6:55 AM

## 2021-05-07 NOTE — PLAN OF CARE
Problem: POSTPARTUM  Goal: Experiences normal postpartum course  Description: INTERVENTIONS:  - Monitor maternal vital signs  - Assess uterine involution and lochia  Outcome: Adequate for Discharge  Goal: Appropriate maternal -  bonding  Description: INTERVENTIONS:  - Identify family support  - Assess for appropriate maternal/infant bonding   -Encourage maternal/infant bonding opportunities  - Referral to  or  as needed  Outcome: Adequate for Discharge  Goal: Establishment of infant feeding pattern  Description: INTERVENTIONS:  - Assess breast/bottle feeding  - Refer to lactation as needed  Outcome: Adequate for Discharge  Goal: Incision(s), wounds(s) or drain site(s) healing without S/S of infection  Description: INTERVENTIONS  - Assess and document risk factors for skin impairment   - Assess and document dressing, incision, wound bed, drain sites and surrounding tissue  - Consider nutrition services referral as needed  - Oral mucous membranes remain intact  - Provide patient/ family education  Outcome: Adequate for Discharge     Problem: PAIN - ADULT  Goal: Verbalizes/displays adequate comfort level or baseline comfort level  Description: Interventions:  - Encourage patient to monitor pain and request assistance  - Assess pain using appropriate pain scale  - Administer analgesics based on type and severity of pain and evaluate response  - Implement non-pharmacological measures as appropriate and evaluate response  - Consider cultural and social influences on pain and pain management  - Notify physician/advanced practitioner if interventions unsuccessful or patient reports new pain  Outcome: Adequate for Discharge     Problem: INFECTION - ADULT  Goal: Absence or prevention of progression during hospitalization  Description: INTERVENTIONS:  - Assess and monitor for signs and symptoms of infection  - Monitor lab/diagnostic results  - Monitor all insertion sites, i e  indwelling lines, tubes, and drains  - Monitor endotracheal if appropriate and nasal secretions for changes in amount and color  - [place] appropriate cooling/warming therapies per order  - Administer medications as ordered  - Instruct and encourage patient and family to use good hand hygiene technique  - Identify and instruct in appropriate isolation precautions for identified infection/condition  Outcome: Adequate for Discharge     Problem: SAFETY ADULT  Goal: Patient will remain free of falls  Description: INTERVENTIONS:  - Assess patient frequently for physical needs  -  Identify cognitive and physical deficits and behaviors that affect risk of falls    -  [place] fall precautions as indicated by assessment   - Educate patient/family on patient safety including physical limitations  - Instruct patient to call for assistance with activity based on assessment  - Modify environment to reduce risk of injury  - Consider OT/PT consult to assist with strengthening/mobility  Outcome: Adequate for Discharge  Goal: Maintain or return to baseline ADL function  Description: INTERVENTIONS:  -  Assess patient's ability to carry out ADLs; assess patient's baseline for ADL function and identify physical deficits which impact ability to perform ADLs (bathing, care of mouth/teeth, toileting, grooming, dressing, etc )  - Assess/evaluate cause of self-care deficits   - Assess range of motion  - Assess patient's mobility; develop plan if impaired  - Assess patient's need for assistive devices and provide as appropriate  - Encourage maximum independence but intervene and supervise when necessary  - Involve family in performance of ADLs  - Assess for home care needs following discharge   - Consider OT consult to assist with ADL evaluation and planning for discharge  - Provide patient education as appropriate  Outcome: Adequate for Discharge  Goal: Maintain or return mobility status to optimal level  Description: INTERVENTIONS:  - Assess patient's baseline mobility status (ambulation, transfers, stairs, etc )    - Identify cognitive and physical deficits and behaviors that affect mobility  - Identify mobility aids required to assist with transfers and/or ambulation (gait belt, sit-to-stand, lift, walker, cane, etc )  - [place] fall precautions as indicated by assessment  - Record patient progress and toleration of activity level on Mobility SBAR; progress patient to next Phase/Stage  - Instruct patient to call for assistance with activity based on assessment  - Consider rehabilitation consult to assist with strengthening/weightbearing, etc   Outcome: Adequate for Discharge     Problem: Knowledge Deficit  Goal: Patient/family/caregiver demonstrates understanding of disease process, treatment plan, medications, and discharge instructions  Description: Complete learning assessment and assess knowledge base    Interventions:  - Provide teaching at level of understanding  - Provide teaching via preferred learning methods  Outcome: Adequate for Discharge     Problem: DISCHARGE PLANNING  Goal: Discharge to home or other facility with appropriate resources  Description: INTERVENTIONS:  - Identify barriers to discharge w/patient and caregiver  - Arrange for needed discharge resources and transportation as appropriate  - Identify discharge learning needs (meds, wound care, etc )  - Arrange for interpretive services to assist at discharge as needed  - Refer to Case Management Department for coordinating discharge planning if the patient needs post-hospital services based on physician/advanced practitioner order or complex needs related to functional status, cognitive ability, or social support system  Outcome: Adequate for Discharge

## 2021-05-10 NOTE — UTILIZATION REVIEW
Inpatient Admission Authorization Request   Notification of Maternity/Delivery &  Birth Information for Admission   SERVICING FACILITY:   [address] [place] [address], [place] [address] [address] [facility]  Tax ID: [id number]  NPI: [id number]  Place of [facility]patient [facility]  Place of Service Code: 24     ATTENDING PROVIDER:  Attending Name and NPI#: Ruiz Decker Md [[id number]]  Address: [address] [place] [address], [place] [address] [address] [facility]  Phone: [phone]     UTILIZATION REVIEW CONTACT:  Gato Weinberg Utilization   Network Utilization Review Department  Phone: [phone]  Fax [phone]  Email: [email or web address]     PHYSICIAN ADVISORY SERVICES:  FOR OUDM-AH-MHCT REVIEW - MEDICAL NECESSITY DENIAL  Phone: [phone]  Fax: [phone]  Email: [email or web address]     TYPE OF REQUEST:  Inpatient Status     ADMISSION INFORMATION:  ADMISSION DATE/TIME: 21 1010  PATIENT DIAGNOSIS CODE/DESCRIPTION:  Abdominal pain affecting pregnancy [O26 899, R10 9] The primary encounter diagnosis was Lactating mother  Diagnoses of 35 weeks gestation of pregnancy and  (spontaneous vaginal delivery) were also pertinent to this visit  1  Lactating mother    2  35 weeks gestation of pregnancy    3   (spontaneous vaginal delivery)      DISCHARGE DATE/TIME: 2021  1:40 PM  DISCHARGE DISPOSITION (IF DISCHARGED): Home/Self Care     MOTHER AND  INFORMATION:  Mother: aMría Luevano 1994   Delivering clinician:    OB History        2    Para   2    Term           2    AB        Living   2       SAB        TAB        Ectopic        Multiple   0    Live Births   2               [place] Name & MRN:   Information for the patient's :  Matilde Brasher) [[id number]]      Delivery Information:  Sex: female  Delivered 2021 4:45 PM by Vaginal, Spontaneous;  Gestational Age: 35w6d     Measurements:  Weight: 6 lb 7 oz (2920 g); Height: 19"    APGAR 1 minute 5 minutes 10 minutes   Totals: 8 9       Birth Information: 31 y o  female MRN: [id number] Unit/Bed#: L&D 314-01 Estimated Date of Delivery: 6/3/21  Birthweight: No birth weight on file  Gestational Age: <None> Delivery Type: Vaginal, Spontaneous          APGARS  One minute Five minutes Ten minutes   Totals:               IMPORTANT INFORMATION:  Please contact the Shaunna Cailla directly with any questions or concerns regarding this request  Department voicemails are confidential     Send requests for admission clinical reviews, concurrent reviews, approvals, and administrative denials due to lack of clinical to fax [phone]  Initial Clinical Review OBSERVATION ADMISSION 2021 CONVERTED TO INPATIENT ADMISSION 2021 1010 DUE TO ADVANCED CERVICAL DILATATION  WITH  LABOR   Admission Orders (From admission, onward)     Ordered        21 1010  Inpatient Admission  Once         21  Place in Observation  Once                Admission: Date/Time/Statement:        21 1003  Inpatient Admission (Inpatient Admission) Once     Question Answer Comment   Level of Care Med Surg    Estimated length of stay More than 2 Midnights    Certification I certify that inpatient services are medically necessary for this patient for a duration of greater than two midnights  See H&P and MD Progress Notes for additional information about the patient's course of treatment  21 1010     ED Arrival Information     Patient not seen in ED                     Chief Complaint   Patient presents with    Contractions       Initial Presentation:  ON 2021 26yo  at 35w5d weeks gestation  Observation admission CONVERTED TO INPATIENT ADMISSION  in the setting of advanced cervical dilation and possible  labor   Presented 5/3/2021 for same issue had dilated from 3 cm> 5 cm & stabilized  She states that this evening the contractions became more frequent and less than 5 minutes apart  Was 570/-2 this AM and now 6/80/-2, cervix anterior, soft and stretchy  Plan for 2 hr check; IV PCN in case PTL, s/p BTM -  Start  Continuous external uterine contraction & fetal HR monitoring  Viable Baby girl born 2021 @1645, birthweight= 2920 g (6 lb 7 oz)  2021 10:36 PM Cervical Dilation: 6  Date:    Day 2:   slight cervical change 6-  Contractions every 5-8 minutes  FHT with occasional late decelerations, but reactive with moderate variability  Will discuss with ATTENDING  If decelerations continue, will likely move towards augmenting delivery with AROM/pitocin  ATTENDING ob: SVE: 7   ADMITTED for  labor,  Vertex confirmed by palpation  She was admitted last night with contractions and was noted to have advanced cervical dilation  Patient made slow change over night  TOCO: Contraction Frequency (minutes): 6-8  2021 2:01 PM AROM  Cervical Dilation: 7-8  2021 3:58 pm Cervical Effacement: 100,  Forebag appreciated, another AROM performed  Delivery Summary -  Procedure:   Anesthesia: none   Findings:  1   Viable female  delivered on 21 at 1645 weight pending;  Apgar scores of 8 at one minute and 9     ED Triage Vitals   Temperature Pulse Respirations Blood Pressure SpO2   21 0700   98 5 °F (36 9 °C) (!) 112 18 134/69 98 %      Temp Source Heart Rate Source Patient Position - Orthostatic VS BP Location FiO2 (%)   21 0000 21 0000 21 0000 --   Oral Monitor Sitting Right arm       Pain Score       21       6          Wt Readings from Last 1 Encounters:   21 82 8 kg (182 lb 9 6 oz)     Additional Vital Signs:   Date/Time  Temp  Pulse  Resp  BP   21 0905  97 5 °F (36 4 °C)  84  --  106/57   21 0412  97 9 °F (36 6 °C)  99  18 100/51   05/05/21 0004  98 1 °F (36 7 °C)  86  18  101/60   05/04/21 2000  98 5 °F (36 9 °C)  112Abnormal   18  134/69     Pertinent Labs/Diagnostic Test Results:       Results from last 7 days   Lab Units 05/05/21  1056   WBC Thousand/uL 6 47   HEMOGLOBIN g/dL 11 2*   HEMATOCRIT % 37 0   PLATELETS Thousands/uL 230   BANDS PCT % 1                 ED Treatment:   Medication Administration - No Administrations Displayed (No Start Event Found)     None        Past Medical History:   Diagnosis Date    Gallstone     Hypothyroid     Kidney stones      Present on Admission:   Maternal anemia in pregnancy, antepartum   (Resolved) Hypothyroid in pregnancy, antepartum      Admitting Diagnosis: Abdominal pain affecting pregnancy [O26 899, R10 9]  Age/Sex: 31 y o  female  Admission Orders:  Continuous external uterine contraction & fetal HR monitoring  Scheduled Medications:  bupivacaine (PF), , ,   levothyroxine, 125 mcg, Oral, Early Morning  Continuous IV Infusions:  lactated ringers, 125 mL/hr, Intravenous, Continuous    PRN Meds:  ondansetron, 4 mg, Intravenous, Q6H PRN    None  Network Utilization Review Department  ATTENTION: Please call with any questions or concerns to [phone] and carefully listen to the prompts so that you are directed to the right person  All voicemails are confidential   Farrel Bodily all requests for admission clinical reviews, approved or denied determinations and any other requests to dedicated fax number below belonging to the campus where the patient is receiving treatment   List of dedicated fax numbers for the Facilities:  [address] DENIALS (Administrative/Medical Necessity) [phone]   [address] (Maternity/NICU/Pediatrics) [phone]   [address] Marie [employer] Kaylee [phone]     Oliverio Plunkett [address] [place] [address] [phone] [place] [address] [phone]

## 2021-05-14 ENCOUNTER — TELEPHONE (OUTPATIENT)
Dept: OBGYN CLINIC | Facility: CLINIC | Age: 26
End: 2021-05-14

## 2021-05-14 NOTE — TELEPHONE ENCOUNTER
Call placed to Winston Prieto for PostPartum Outreach    She is a [id number] who is now 9 days PP  Del Date/type:  21 baby girl   delivery 35w6d  No lacerations or episiotomy  Anesthesia: none; Complications: none noted  Feeding method: Breast and bottle    Reports lochia is minimal- comes and goes  Denies vaginal complaints  Denies Urinary symptoms    Support at home  Patients sleep pattern:Good  Spouse takes one of the nighttime feeds so she can sleep  Patients Eating pattern:  SI/HI: denies    Anticipatory guidance reviewed regarding:  -Lochia-expect to last for approx 3 wks, but can last 4-5 in some cases  -  -perineum- nothing per vagina- no sex until PP visit  -Breasts-s/s mastitis reviewed       Transferred to scheduled to make PP appt

## 2021-05-24 ENCOUNTER — TELEPHONE (OUTPATIENT)
Dept: OBGYN CLINIC | Facility: CLINIC | Age: 26
End: 2021-05-24

## 2021-05-24 NOTE — TELEPHONE ENCOUNTER
She is not having fever  She is not having pain  She thinks the spots may be  Be due to her grabbing the breast before pumping   I advised well fitted flanges as well and to monitor symptoms for now, doubt this is mastitis

## 2021-05-24 NOTE — TELEPHONE ENCOUNTER
----- Message from Cecilia Sellers sent at 2021  7:47 AM EDT -----  Regarding: FW: Non-Urgent Medical Question  Contact: [phone]    ----- Message -----  From: Iram Ellis  Sent: 2021   7:31 AM EDT  To: [facility] Clinical  Subject: Non-Urgent Medical Question                      Good morning  I have noticed a red spot on my left breast but it wasn't causing any discomfort  This morning I woke up the red spot now looks bruised & red  This is the same breast that I do have some difficulty emptying when I pump  I have never had mastitis and I don't exhibit any other symptoms  I have included a picture of the hannah      Thanks again

## 2021-06-03 NOTE — PROGRESS NOTES
Assessment/Plan:           1  Postpartum care and examination    4wks PostPartum  Normal postpartum exam    Continue breastfeeding and PNV until breastfeeding done  S/S Mastitis reviewed; availability of baby and me program as resource if needed  The patient may advance activity as tolerated and may resume sexual activity at 6 wks PP  Contraception discussed  See other A&P    She will RTO for routine annual gyn exam in 3-6 mos  The patient agrees with plan  2  Acquired hypothyroidism  Assessment & Plan:  Last TSH euthyroid  Recheck in 6 wks  Refer further management back to PCP/Endo    Orders:  -     TSH, 3rd generation with Free T4 reflex; Future; Expected date: 07/16/2021    3  Encounter for counseling regarding contraception  Assessment & Plan:  Contraceptive options were reviewed including hormonal methods, both combination (pill, patch, vaginal ring) and progesterone-only (pill, Depo Provera, Implanon), intrauterine devices (Mirena, Paragard), barrier methods (condoms, diaphragm), and male and female sterilization  The mechanism, risks, benefits, and side effects of all methods were discussed  Due to current breastfeeding status advised against estrogen containing products as this may affect milk supply  IUD counseling  The patient would like to further pursue Mirena IUD  Discussed with patient that she is a good candidate with low risk for the IUD in view that she is in a monogamous long term relationship  She verbalized understanding of the risks including risk of pregnancy approximately 1%, risk of ectopic pregnancy, irregular/absent/painful menses, risks of infection, expulsion risk of 2-10% in 1st year, perforation through the uterus, migration and need for surgery, among others  Patient also understands that the IUD does not protect against STDs and she should continue consistent use of condoms for this purpose    Again reviewed common side effects particularly irregular bleeding the first 3-6 months that typically lessens with time  Reviewed timing of insertion and since PP will schedule with 1st available     Will check benefits  4  Maternal anemia in pregnancy, antepartum  Assessment & Plan:  Doing well PP  Labs WNL on admission and no excess bleeding with delivery or PP  No further need for additional iron at present      5  Pre-procedural examination  -     Chlamydia/GC amplified DNA by PCR; Future; Expected date: 2021    6  Screen for STD (sexually transmitted disease)  -     Chlamydia/GC amplified DNA by PCR; Future; Expected date: 2021          Subjective:      Patient ID: Rafita Quintana is a 31 y o  female  HPI  She presents for routine postpartum visit  She is now a  who is 4 wks s/p  on 21 baby girl Marie Velazquez) at 35wd- PTL  Anesthesia: None  Perineum: No repair needed    Antepartum complications include:  · Anemia- required IV iron infusion  CBC on admission wnl - H/H- 11  0  · Hypothryoid, antepartum  · Hx of PTL in prior pregnany  · Unknown GBS    Postpartum course was uncomplicated  Since d/c home she has had no complaints  Scant vaginal bleeding, with wiping mostly  She denies abn bleeding, pelvic pain, breast complaints, bowel/bladder dysfunction, depression/anx  Baby is thriving and is Breastfeeding and Bottle feeding  Occasional clogged duct  Pumps and massages area for spontaneous resolution    Already down to pre-pregnancy weight  Plans for contraception:  Interested in IUD      The following portions of the patient's history were reviewed and updated as appropriate: allergies, current medications, past family history, past medical history, past social history, past surgical history, and problem list     Review of Systems   Constitutional: Negative for activity change, chills, fatigue, fever and unexpected weight change  Respiratory: Negative  Cardiovascular: Negative  Gastrointestinal: Negative      Endocrine: Negative  Genitourinary:        See HPI for gyn c/o   Neurological: Negative  Objective:  /62 (BP Location: Right arm, Patient Position: Sitting, Cuff Size: Standard)   Pulse 76   Temp 97 8 °F (36 6 °C) (Temporal)   Ht 5' 4" (1 626 m)   Wt 73 4 kg (161 lb 12 8 oz)   LMP 08/27/2020 (LMP Unknown)   BMI 27 77 kg/m²     Physical Exam  Constitutional:       General: She is not in acute distress  Appearance: Normal appearance  Genitourinary:      Pelvic exam was performed with patient in the lithotomy position  Urethra, uterus and rectum normal       No vulval lesion, tenderness, ulcerations, Bartholin's cyst or rash noted  No signs of labial injury  No posterior fourchette tenderness, rash or lesion present  Bladder is not tender  No signs of injury or lesions in the vagina  No vaginal discharge or erythema  Bleeding: blood streaked mucousy d/c  No cervical motion tenderness, discharge, friability, lesion or erythema  Uterus is not enlarged, tender or mobile  No right or left adnexal mass present  Right adnexa not tender or full  Left adnexa not tender or full  Genitourinary Comments: Perineum intact  Involuted uterus noted   HENT:      Head: Normocephalic  Cardiovascular:      Rate and Rhythm: Normal rate  Pulmonary:      Effort: Pulmonary effort is normal    Abdominal:      General: There is no distension  Palpations: Abdomen is soft  Tenderness: There is no abdominal tenderness  Comments: No diastasis recti noted   Neurological:      Mental Status: She is alert and oriented to person, place, and time  Psychiatric:         Mood and Affect: Mood normal          Behavior: Behavior normal    Vitals signs reviewed

## 2021-06-03 NOTE — PATIENT INSTRUCTIONS
Iud pre-instruction  · We are going to check your insurance benefits to verify whether or not your insurance will cover the [address]  · Take 600mg motrin (or similar over the counter pain medication) 1 Hour before appointment  · [employer]  [facility]/            Self Care After Delivery   AMBULATORY CARE:   The postpartum period  is the period of time from delivery to about 6 weeks  During this time you may experience many physical and emotional changes  It is important to understand what is normal and when you need to call your healthcare provider  It is also important to know how to care for yourself during this time  Call your local emergency number (911 in the [address]) for any of the following:   · You see or hear things that are not there, or have thoughts of harming yourself or your baby  · You soak through 1 pad in 15 minutes, have blurry vision, clammy or pale skin, and feel faint  · You faint or lose consciousness  · You have trouble breathing  · You cough up blood  · Your  incision comes apart  Seek care immediately if:   · Your heart is beating faster than usual     · You have a bad headache or changes in your vision  · Your episiotomy or  incision is red, swollen, bleeding, or draining pus  · You have severe abdominal pain  Call your doctor or obstetrician if:   · Your leg is painful, red, and larger than usual     · You soak through 1 or more pads in an hour, or pass blood clots larger than a quarter from your vagina  · You have a fever  · You have new or worsening pain in your abdomen or vagina  · You continue to have depression 1 to 2 weeks after you deliver  · You have trouble sleeping  · You have foul-smelling discharge from your vagina  · You have pain or burning when you urinate  · You do not have a bowel movement for 3 days or more  · You have nausea or are vomiting      · You have hard lumps or red streaks over your breasts  · You have cracked nipples or bleed from your nipples  · You have questions or concerns about your condition or care  Physical changes: The following are normal changes after you give birth:  · Pain in the area between your anus and vagina    · Breast pain    · Constipation or hemorrhoids    · Hot or cold flashes    · Vaginal bleeding or discharge    · Mild to moderate abdominal cramping    · Difficulty controlling bowel movements or urine    Emotional changes:  A drop in hormone levels after you deliver may cause changes in your emotions  You may feel irritable, sad, or anxious  You may cry easily or for no reason  You may also feel depressed  Depression that continues can be a sign of postpartum depression, a condition that can be treated  Treatment may include talk therapy, medicines, or both  Healthcare providers will ask how you are feeling and if you have any depression  These talks can happen during appointments for your medical care and for your baby's care, such as well child visits  Providers can help you find ways to care for yourself and your baby  Talk to your providers about the following:  · When emotional changes or depression started, and if it is getting worse over time    · Problems you are having with daily activities, sleep, or caring for your baby    · If anything makes you feel worse, or makes you feel better    · Feeling that you are not bonding with your baby the way you want    · Any problems your baby has with sleeping or feeding    · Your baby is fussy or cries a lot    · Support you have from friends, family, or others    Breast care for breastfeeding mothers: You may have sore breasts for 3 to 6 days after you give birth  This happens as your milk begins to fill your breasts  You may also have sore breasts if you do not breastfeed frequently  Do the following to care for your breasts:  · Apply a moist, warm, compress to your breast as directed    This may help soothe your breasts  Make sure the washcloth is not too hot before you apply it to your breast     · Nurse your baby or pump your milk frequently  This may prevent clogged milk ducts  Ask your healthcare provider how often to nurse or pump  · Massage your breasts as directed  This may help increase your milk flow  Gently rub your breasts in a circular motion before you breastfeed  You may need to gently squeeze your breast or nipple to help release milk  You can also use a breast pump to help release milk from your breast     · Wash your breasts with warm water only  Do not put soap on your nipples  Soap may cause your nipples to become dry  · Apply lanolin cream to your nipples as directed  Lanolin cream may add moisture to your skin and prevent nipple dryness  Always  wash off lanolin cream with warm water before you breastfeed  · Place pads in your bra  Your nipples may leak milk when you are not breastfeeding  You can place pads inside of your bra to help prevent leaking onto your clothing  Ask your healthcare provider where to purchase bra pads  · Get breastfeeding support if needed  Healthcare providers can answer questions about breastfeeding and provide you with support  Ask your healthcare provider who you can contact if you need breastfeeding support  Breast care for non-breastfeeding mothers:  Milk will fill your breasts even if you bottle feed your baby  Do the following to help stop your milk from filling your breasts and causing pain:  · Wear a bra with support at all times  A sports bra or a tight-fitting bra will help stop your milk from coming in  · Apply ice on each breast for 15 to 20 minutes every hour or as directed  Use an ice pack, or put crushed ice in a plastic bag  Cover it with a towel before you apply it to your breast  Ice helps your milk ducts shrink  · Keep your breasts away from warm water  Warm water will make it easier for milk to fill your breasts   Stand with your breasts away from warm water in the shower  · Limit how much you touch your breasts  This will prevent them from filling with milk  Perineum care: Your perineum is the area between your rectum and vagina  It is normal to have swelling and pain in this area after you give birth  If you had an episiotomy, your healthcare provider may give you special instructions  · Clean your perineum after you use the bathroom  This may prevent infection and help with healing  Use a spray bottle with warm water to clean your perineum  You may also gently spray warm water against your perineum when you urinate  Always wipe front to back  · Take a sitz bath as directed  A sitz bath may help relieve swelling and pain  Fill your bath tub or bucket with water up to your hips and sit in the water  Use cold water for 2 days after you deliver  Then use warm water  Ask your healthcare provider for more information about a sitz bath  · Apply ice packs for the first 24 hours or as directed  Use a plastic glove filled with ice or buy an ice pack  Wrap the ice pack or plastic glove in a small towel or wash cloth  Place the ice pack on your perineum for 20 minutes at a time  · Sit on a donut-shaped pillow  This may relieve pressure on your perineum when you sit  · Use wipes that contain medicine or take pills as directed  Your healthcare provider may tell you to use witch hazel pads  You can place witch hazel pads in the refrigerator before you apply them to your perineum  Your provider may also tell you to take NSAIDs  Ask him or her how often to take pills or use the wipes  · Do not go swimming or take tub baths for 4 to 6 weeks or as directed  This will help prevent an infection in your vagina or uterus  Bowel and bladder care: It may take 3 to 5 days to have a bowel movement after you deliver your baby   You can do the following to prevent or manage constipation, and get control of your bowel or bladder:  · Take stool softeners as directed  A stool softener is medicine that will make your bowel movements softer  This may prevent or relieve constipation  A stool softener may also make bowel movements less painful  · Drink plenty of liquids  Ask how much liquid to drink each day and which liquids are best for you  Liquids may help prevent constipation  · Eat foods high in fiber  Examples include fruits, vegetables, grains, beans, and lentils  Ask your healthcare provider how much fiber you need each day  Fiber may prevent constipation  · Do Kegel exercises as directed  Kegel exercises will help strengthen the muscles that control bowel movements and urination  Ask your healthcare provider for more information on Kegel exercises  · Apply cold compresses or medicine to hemorrhoids as directed  This may relieve swelling and pain  Your healthcare provider may tell you to apply ice or wipes that contain medicine to your hemorrhoids  He or she may also tell you to use a sitz bath  Ask your provider for more information on how to manage hemorrhoids  Nutrition:  Good nutrition is important in the postpartum period  It will help you return to a healthy weight, increase your energy levels, and prevent constipation  It will also help you get enough nutrients and calories if you are going to breastfeed your baby  · Eat a variety of healthy foods  Healthy foods include fruits, vegetables, whole-grain breads, low-fat dairy products, beans, lean meats, and fish  You may need 500 to 700 extra calories each day if you breastfeed your baby  You may also need extra protein  · Limit foods with added sugar and high amounts of fat  These foods are high in calories and low in healthy nutrients  Read food labels so you know how much sugar and fat is in the food you want to eat  · Drink 8 to 10 glasses of water per day  Water will help you make plenty of milk for your baby   It will also help prevent constipation  Drink a glass of water every time you breastfeed your baby  · Take vitamins as directed  Ask your healthcare provider what vitamins you need  · Limit caffeine and alcohol if you are breastfeeding  Caffeine and alcohol can get into your breast milk  Caffeine and alcohol can make your baby fussy  They can also interfere with your baby's sleep  Ask your healthcare provider if you can drink alcohol or caffeine  Rest and sleep: You may feel very tired in the postpartum period  Enough sleep will help you heal and give you energy to care for your baby  The following may help you get sleep and rest:  · Nap when your baby naps  Your baby may nap several times during the day  Get rest during this time  · Limit visitors  Many people may want to see you and your baby  Ask friends or family to visit on different days  This will give you time to rest     · Do not plan too much for one day  Put off household chores so that you have time to rest  Gradually do more each day  · Ask for help from family, friends, or neighbors  Ask them to help you with laundry, cleaning, or errands  Also ask someone to watch the baby while you take a nap or relax  Ask your partner to help with the care of your baby  Pump some of your breast milk so your partner can feed your baby during the night  Exercise after delivery:  Wait until your healthcare provider says it is okay to exercise  Exercise can help you lose weight, increase your energy levels, and manage your mood  It can also prevent constipation and blood clots  Start with gentle exercises such as walking  Do more as you have more energy  You may need to avoid abdominal exercises for 1 to 2 weeks after you deliver  Talk to your healthcare provider about an exercise plan that is right for you  Sexual activity after delivery:   · Do not have sex until your healthcare provider says it is okay   You may need to wait 4 to 6 weeks before you have sex  This may prevent infection and allow time to heal     · Your menstrual cycle may begin as soon as 3 weeks after you deliver  Your period may be delayed if you breastfeed your baby  You can become pregnant before you get your first postpartum period  Talk to your healthcare provider about birth control that is right for you  Some types of birth control are not safe during breastfeeding  For support and more information:  Join a support group for new mothers  Ask for help from family and friends with chores, errands, and care of your baby  · Office of Women's Health,  Department of Health and Human Services  [address], [address] [place] [address]  [address], [address] [place]  [place] , [address]  Phone: 8- [phone]  Web Address: www womenshealth gov  · [facility] [address] [facility] , [address] [place] [address] Road  [address] [facility] , [address] [place] [address]  Web Address: [employer]  [email or web address]/[email or web address]-care  [email or web address]  Follow up with your doctor or obstetrician as directed: You will need to follow up within 2 to 6 weeks of delivery  Write down your questions so you remember to ask them at your visits  © Copyright [address] Information is for End User's use only and may not be sold, redistributed or otherwise used for commercial purposes  All illustrations and images included in CareNotes® are the copyrighted property of A D A M , Inc  or Ramu Adler   The above information is an  only  It is not intended as medical advice for individual conditions or treatments  Talk to your doctor, nurse or pharmacist before following any medical regimen to see if it is safe and effective for you

## 2021-06-04 ENCOUNTER — TELEPHONE (OUTPATIENT)
Dept: OBGYN CLINIC | Facility: CLINIC | Age: 26
End: 2021-06-04

## 2021-06-04 ENCOUNTER — POSTPARTUM VISIT (OUTPATIENT)
Dept: OBGYN CLINIC | Facility: CLINIC | Age: 26
End: 2021-06-04

## 2021-06-04 VITALS
SYSTOLIC BLOOD PRESSURE: 104 MMHG | TEMPERATURE: 97.8 F | HEIGHT: 64 IN | HEART RATE: 76 BPM | WEIGHT: 161.8 LBS | BODY MASS INDEX: 27.62 KG/M2 | DIASTOLIC BLOOD PRESSURE: 62 MMHG

## 2021-06-04 DIAGNOSIS — O99.019 MATERNAL ANEMIA IN PREGNANCY, ANTEPARTUM: ICD-10-CM

## 2021-06-04 DIAGNOSIS — Z11.3 SCREEN FOR STD (SEXUALLY TRANSMITTED DISEASE): ICD-10-CM

## 2021-06-04 DIAGNOSIS — E03.9 ACQUIRED HYPOTHYROIDISM: ICD-10-CM

## 2021-06-04 DIAGNOSIS — Z01.818 PRE-PROCEDURAL EXAMINATION: ICD-10-CM

## 2021-06-04 DIAGNOSIS — Z30.09 ENCOUNTER FOR COUNSELING REGARDING CONTRACEPTION: ICD-10-CM

## 2021-06-04 PROBLEM — O60.03 PRETERM LABOR IN THIRD TRIMESTER: Status: RESOLVED | Noted: 2021-05-05 | Resolved: 2021-06-04

## 2021-06-04 PROCEDURE — 87491 CHLMYD TRACH DNA AMP PROBE: CPT | Performed by: CLINICAL NURSE SPECIALIST

## 2021-06-04 PROCEDURE — 99024 POSTOP FOLLOW-UP VISIT: CPT | Performed by: CLINICAL NURSE SPECIALIST

## 2021-06-04 PROCEDURE — 87591 N.GONORRHOEAE DNA AMP PROB: CPT | Performed by: CLINICAL NURSE SPECIALIST

## 2021-06-04 NOTE — ASSESSMENT & PLAN NOTE
Doing well PP  Labs WNL on admission and no excess bleeding with delivery or PP    No further need for additional iron at present

## 2021-06-04 NOTE — ASSESSMENT & PLAN NOTE
Contraceptive options were reviewed including hormonal methods, both combination (pill, patch, vaginal ring) and progesterone-only (pill, Depo Provera, Implanon), intrauterine devices (Mirena, Paragard), barrier methods (condoms, diaphragm), and male and female sterilization  The mechanism, risks, benefits, and side effects of all methods were discussed  Due to current breastfeeding status advised against estrogen containing products as this may affect milk supply  IUD counseling  The patient would like to further pursue Mirena IUD  Discussed with patient that she is a good candidate with low risk for the IUD in view that she is in a monogamous long term relationship  She verbalized understanding of the risks including risk of pregnancy approximately 1%, risk of ectopic pregnancy, irregular/absent/painful menses, risks of infection, expulsion risk of 2-10% in 1st year, perforation through the uterus, migration and need for surgery, among others  Patient also understands that the IUD does not protect against STDs and she should continue consistent use of condoms for this purpose  Again reviewed common side effects particularly irregular bleeding the first 3-6 months that typically lessens with time  Reviewed timing of insertion and since PP will schedule with 1st available     Will check benefits

## 2021-06-04 NOTE — TELEPHONE ENCOUNTER
Called patients primary insurance, [employer], and spoke with [employer]  She stated for the Mirena IUD it is covered in full so no deductible, co-insurance, or co-pay is applied  Reference number for the call is [id number]  Will contact patients secondary insurance, [employer], to review coverage with them

## 2021-06-06 LAB
C TRACH DNA SPEC QL NAA+PROBE: NEGATIVE
N GONORRHOEA DNA SPEC QL NAA+PROBE: NEGATIVE

## 2021-06-09 NOTE — TELEPHONE ENCOUNTER
Called patients secondary insurance, [employer], and spoke with Jose Quintana who confirmed under that plan the Mirena IUD is also covered at 100% for insertion and removal  Reference number is Aliza 6/9/21 9:59 AM

## 2021-06-10 ENCOUNTER — PROCEDURE VISIT (OUTPATIENT)
Dept: OBGYN CLINIC | Facility: CLINIC | Age: 26
End: 2021-06-10
Payer: COMMERCIAL

## 2021-06-10 VITALS
DIASTOLIC BLOOD PRESSURE: 62 MMHG | HEART RATE: 91 BPM | SYSTOLIC BLOOD PRESSURE: 100 MMHG | HEIGHT: 64 IN | TEMPERATURE: 97.5 F | BODY MASS INDEX: 26.44 KG/M2 | WEIGHT: 154.9 LBS

## 2021-06-10 DIAGNOSIS — Z01.812 PRE-PROCEDURE LAB EXAM: ICD-10-CM

## 2021-06-10 DIAGNOSIS — Z30.430 ENCOUNTER FOR IUD INSERTION: Primary | ICD-10-CM

## 2021-06-10 PROBLEM — O99.019 MATERNAL ANEMIA IN PREGNANCY, ANTEPARTUM: Status: RESOLVED | Noted: 2021-03-16 | Resolved: 2021-06-10

## 2021-06-10 PROBLEM — Z30.09 ENCOUNTER FOR COUNSELING REGARDING CONTRACEPTION: Status: RESOLVED | Noted: 2021-06-04 | Resolved: 2021-06-10

## 2021-06-10 PROBLEM — Z30.431 CONTRACEPTIVE, SURVEILLANCE, INTRAUTERINE DEVICE: Status: ACTIVE | Noted: 2021-06-10

## 2021-06-10 LAB — SL AMB POCT URINE HCG: NEGATIVE

## 2021-06-10 PROCEDURE — 58300 INSERT INTRAUTERINE DEVICE: CPT | Performed by: CLINICAL NURSE SPECIALIST

## 2021-06-10 PROCEDURE — 81025 URINE PREGNANCY TEST: CPT | Performed by: CLINICAL NURSE SPECIALIST

## 2021-06-10 NOTE — PROGRESS NOTES
IUD INSERTION    The risks (including infection, bleeding, pain, and uterine perforation) and benefits of the procedure were explained to the patient and informed consent was obtained  Iud insertions    Date/Time: 6/10/2021 7:27 AM  Performed by: SHIRA Mai  Authorized by: SHIRA Mai   Universal Protocol:  Consent: Written consent obtained  Risks and benefits: risks, benefits and alternatives were discussed (In particular the risk including infection, irregular bleeding, and uterine perforation)  Consent given by: patient  Time out: Immediately prior to procedure a "time out" was called to verify the correct patient, procedure, equipment, support staff and site/side marked as required  Patient understanding: patient states understanding of the procedure being performed  Patient consent: the patient's understanding of the procedure matches consent given  Procedure consent: procedure consent matches procedure scheduled  Test results: test results available and properly labeled  Patient identity confirmed: verbally with patient        Procedure:     Pelvic exam performed: no      Negative GC/chlamydia test: yes      Negative urine pregnancy test: yes      Cervix cleaned and prepped: yes      Speculum placed in vagina: yes      Tenaculum applied to cervix: no (Allis applied to cervix (anterior lip))      Uterus sounded: yes      Uterus sound depth (cm):  7    IUD inserted with no complications: yes      IUD type:  Mirena    Strings trimmed: yes (2-3 cm)    Post-procedure:     Patient tolerated procedure well: yes      Patient will follow up after next period: yes    Comments:      Post procedure instructions were given  She was advised nothing per vagina x 3 day  The patient was advised to call for any fever or for prolonged or severe pain or bleeding  She was advised to use OTC analgesics as needed for mild to moderate pain

## 2021-06-10 NOTE — PATIENT INSTRUCTIONS
POST IUD INSERTION INSTRUCTIONS  · Due to IUD insertion you can expect some light vaginal bleeding  · Take motrin/ibuprofen as needed for cramping  · Nothing in the vagina for the next 2-3 days    · Try to feel for your strings monthly using 1-2 fingers inserted into vagina  · Call if you increasing pain, fever  100 5 or greater, or heavy bleeding  · Return to the office in 4-6 weeks for a recheck      Your IUD is good for 6 yrs( until 6/2027)

## 2021-07-15 ENCOUNTER — OFFICE VISIT (OUTPATIENT)
Dept: OBGYN CLINIC | Facility: CLINIC | Age: 26
End: 2021-07-15
Payer: COMMERCIAL

## 2021-07-15 VITALS
SYSTOLIC BLOOD PRESSURE: 104 MMHG | HEIGHT: 64 IN | TEMPERATURE: 97.5 F | DIASTOLIC BLOOD PRESSURE: 62 MMHG | BODY MASS INDEX: 27.25 KG/M2 | HEART RATE: 87 BPM | WEIGHT: 159.6 LBS

## 2021-07-15 DIAGNOSIS — N61.0 MASTITIS: ICD-10-CM

## 2021-07-15 DIAGNOSIS — Z30.431 CONTRACEPTIVE, SURVEILLANCE, INTRAUTERINE DEVICE: Primary | ICD-10-CM

## 2021-07-15 PROCEDURE — 99213 OFFICE O/P EST LOW 20 MIN: CPT | Performed by: CLINICAL NURSE SPECIALIST

## 2021-07-15 RX ORDER — DICLOXACILLIN SODIUM 250 MG/1
250 CAPSULE ORAL EVERY 6 HOURS SCHEDULED
Qty: 28 CAPSULE | Refills: 0 | Status: SHIPPED | OUTPATIENT
Start: 2021-07-15 | End: 2021-07-22

## 2021-07-15 RX ORDER — LEVOTHYROXINE SODIUM 112 UG/1
112 TABLET ORAL DAILY
COMMUNITY
Start: 2021-07-02

## 2021-07-15 NOTE — PROGRESS NOTES
Assessment/Plan:       1  Contraceptive, surveillance, intrauterine device  Assessment & Plan:  Patient is doing well with IUD  No complaints of pain  Does have some slightly irregular bleeding which reviewed is common and to be expected in the 1st 3 months  Should resolve within 3-6 months  Encouraged to try to feel for strings on a regular basis and call if menstrual pattern changes significantly or as pelvic pain   IUD is good for 6 years        2  Mastitis  Comments:   exam consistent with likely mild mastitis  Dicloxacillin prescribed  Warning signs to call if fever greater than 101 or symptoms do not improve by 2-3 days  Orders:  -     dicloxacillin (DYNAPEN) 250 mg capsule; Take 1 capsule (250 mg total) by mouth every 6 (six) hours for 7 days          Subjective:      Patient ID: Clifton Joyce is a 31 y o  female  She is here for Follow-up (Patient here for String check (Mirena Inserted 06/10/2021) )    HPI  S/P IUD insertion 6/10   Reports prolonged menses but light x 3 wks  Some occasional spotting  Did have sex and denies any pain/discomfort  Partner also denies discomfort but may have felt strings  Denies any other c/o r/t IUD    She is also reporting right breast pain that started yesterday sharp/stabby and severe  Associated chills but no reported fever    Menstrual History:  No LMP recorded (lmp unknown)  Patient has had an implant            The following portions of the patient's history were reviewed and updated as appropriate: allergies, current medications, past family history, past medical history, past social history, past surgical history, and problem list     Review of Systems  See HPI for pertinent positives          Objective:    /62 (BP Location: Right arm, Patient Position: Sitting, Cuff Size: Standard)   Pulse 87   Temp 97 5 °F (36 4 °C) (Temporal)   Ht 5' 4" (1 626 m)   Wt 72 4 kg (159 lb 9 6 oz)   LMP  (LMP Unknown)   BMI 27 40 kg/m²      Physical Exam  Constitutional:       General: She is not in acute distress  Appearance: Normal appearance  Genitourinary:      Pelvic exam was performed with patient in the lithotomy position  Urethra and uterus normal       No vulval lesion, tenderness, ulcerations, Bartholin's cyst or rash noted  No signs of labial injury  No posterior fourchette tenderness, rash or lesion present  Bladder is not tender  No signs of injury or lesions in the vagina  No vaginal discharge, erythema, tenderness, bleeding or ulceration  No cervical motion tenderness, discharge, friability, lesion or erythema  Uterus is not enlarged, tender or mobile  No right or left adnexal mass present  Right adnexa not tender or full  Left adnexa not tender or full  Genitourinary Comments: IUD strings noted - black- at cervical os- approx 3 cm     HENT:      Head: Normocephalic  Cardiovascular:      Rate and Rhythm: Normal rate  Pulmonary:      Effort: Pulmonary effort is normal    Chest:      Breasts:         Right: Swelling, skin change (+ erythema) and tenderness present  No mass  Left: No swelling, inverted nipple, mass or tenderness  Neurological:      Mental Status: She is alert and oriented to person, place, and time  Psychiatric:         Mood and Affect: Mood normal          Behavior: Behavior normal    Vitals reviewed

## 2021-07-15 NOTE — ASSESSMENT & PLAN NOTE
Patient is doing well with IUD  No complaints of pain  Does have some slightly irregular bleeding which reviewed is common and to be expected in the 1st 3 months  Should resolve within 3-6 months      Encouraged to try to feel for strings on a regular basis and call if menstrual pattern changes significantly or as pelvic pain   IUD is good for 6 years

## 2022-04-11 ENCOUNTER — TELEPHONE (OUTPATIENT)
Dept: OBGYN CLINIC | Facility: CLINIC | Age: 27
End: 2022-04-11

## 2022-04-11 NOTE — TELEPHONE ENCOUNTER
Patient called and asked if she could get a letter stating she has a medical issue  States she was told that they do not have a room where she can pump and when she advised them that she did not feel comfortable in a restroom they stated she needed a note  States if the note can be done the jury duty number is [id number]

## 2022-04-11 NOTE — LETTER
Date: 4/13/2022    To whom it may concern:         Henrique Gurrola is under my professional care and due to her medical condition she requires a private comfortable space to pump as needed  jury duty number is [id number]       Sincerely,         Dr Nic Villanueva MD

## 2023-04-02 NOTE — OB LABOR/OXYTOCIN SAFETY PROGRESS
Labor Progress Note - Neha Forbes 31 y o  female MRN: [id number]    Unit/Bed#: L&D 325-01 Encounter: [id number]       Contraction Frequency (minutes): 4 5-8  Contraction Quality: Mild  Tachysystole: No   Cervical Dilation: 6-7        Cervical Effacement: 90  Fetal Station: -2  Baseline Rate: 140 bpm                     Vital Signs:   Vitals:    05/05/21 0004   BP: 101/60   Pulse: 86   Resp: 18   Temp: 98 1 °F (36 7 °C)           Notes/comments:      Patient with slight cervical change 6-7/90/-2  Contractions every 5-8 minutes  FHT with occasional late decelerations, but reactive with moderate variability  Will discuss with Dr Misty Silver  If decelerations continue, will likely move towards augmenting delivery with AROM/pitocin       Miroslava Larios MD 5/5/2021 1:54 AM no difficulty in swallowing